# Patient Record
Sex: MALE | Race: WHITE | Employment: OTHER | URBAN - METROPOLITAN AREA
[De-identification: names, ages, dates, MRNs, and addresses within clinical notes are randomized per-mention and may not be internally consistent; named-entity substitution may affect disease eponyms.]

---

## 2017-02-12 RX ORDER — SIMVASTATIN 20 MG/1
TABLET, FILM COATED ORAL
Qty: 90 TAB | Refills: 1 | Status: SHIPPED | OUTPATIENT
Start: 2017-02-12 | End: 2021-01-01

## 2017-08-21 RX ORDER — ALLOPURINOL 100 MG/1
TABLET ORAL
Qty: 180 TAB | Refills: 2 | Status: SHIPPED | OUTPATIENT
Start: 2017-08-21

## 2021-01-01 ENCOUNTER — HOSPITAL ENCOUNTER (INPATIENT)
Age: 86
LOS: 8 days | Discharge: NURSING, REHAB OR LONG-TERM CARE | DRG: 871 | End: 2021-02-26
Attending: EMERGENCY MEDICINE | Admitting: HOSPITALIST
Payer: MEDICARE

## 2021-01-01 ENCOUNTER — HOSPICE ADMISSION (OUTPATIENT)
Dept: HOSPICE | Facility: HOSPICE | Age: 86
End: 2021-01-01
Payer: MEDICARE

## 2021-01-01 ENCOUNTER — TELEPHONE (OUTPATIENT)
Dept: INTERNAL MEDICINE CLINIC | Age: 86
End: 2021-01-01

## 2021-01-01 ENCOUNTER — APPOINTMENT (OUTPATIENT)
Dept: INTERVENTIONAL RADIOLOGY/VASCULAR | Age: 86
DRG: 871 | End: 2021-01-01
Attending: SURGERY
Payer: MEDICARE

## 2021-01-01 ENCOUNTER — APPOINTMENT (OUTPATIENT)
Dept: GENERAL RADIOLOGY | Age: 86
DRG: 871 | End: 2021-01-01
Attending: EMERGENCY MEDICINE
Payer: MEDICARE

## 2021-01-01 ENCOUNTER — APPOINTMENT (OUTPATIENT)
Dept: CT IMAGING | Age: 86
DRG: 951 | End: 2021-01-01
Attending: EMERGENCY MEDICINE
Payer: OTHER MISCELLANEOUS

## 2021-01-01 ENCOUNTER — HOSPITAL ENCOUNTER (INPATIENT)
Age: 86
LOS: 3 days | DRG: 951 | End: 2021-03-14
Attending: EMERGENCY MEDICINE | Admitting: INTERNAL MEDICINE
Payer: OTHER MISCELLANEOUS

## 2021-01-01 ENCOUNTER — APPOINTMENT (OUTPATIENT)
Dept: ULTRASOUND IMAGING | Age: 86
DRG: 871 | End: 2021-01-01
Attending: EMERGENCY MEDICINE
Payer: MEDICARE

## 2021-01-01 ENCOUNTER — APPOINTMENT (OUTPATIENT)
Dept: NUCLEAR MEDICINE | Age: 86
DRG: 871 | End: 2021-01-01
Attending: SURGERY
Payer: MEDICARE

## 2021-01-01 ENCOUNTER — APPOINTMENT (OUTPATIENT)
Dept: CT IMAGING | Age: 86
DRG: 871 | End: 2021-01-01
Attending: EMERGENCY MEDICINE
Payer: MEDICARE

## 2021-01-01 ENCOUNTER — HOME CARE VISIT (OUTPATIENT)
Dept: HOSPICE | Facility: HOSPICE | Age: 86
End: 2021-01-01
Payer: MEDICARE

## 2021-01-01 VITALS
RESPIRATION RATE: 16 BRPM | TEMPERATURE: 97.4 F | WEIGHT: 181.3 LBS | OXYGEN SATURATION: 92 % | HEART RATE: 101 BPM | BODY MASS INDEX: 31.12 KG/M2 | DIASTOLIC BLOOD PRESSURE: 49 MMHG | SYSTOLIC BLOOD PRESSURE: 56 MMHG

## 2021-01-01 VITALS
HEIGHT: 64 IN | OXYGEN SATURATION: 95 % | DIASTOLIC BLOOD PRESSURE: 78 MMHG | HEART RATE: 76 BPM | WEIGHT: 184 LBS | SYSTOLIC BLOOD PRESSURE: 119 MMHG | RESPIRATION RATE: 17 BRPM | BODY MASS INDEX: 31.41 KG/M2 | TEMPERATURE: 98 F

## 2021-01-01 DIAGNOSIS — G93.41 ACUTE METABOLIC ENCEPHALOPATHY: ICD-10-CM

## 2021-01-01 DIAGNOSIS — R53.81 DEBILITY: ICD-10-CM

## 2021-01-01 DIAGNOSIS — F05 DELIRIUM DUE TO ANOTHER MEDICAL CONDITION, ACUTE, HYPOACTIVE: ICD-10-CM

## 2021-01-01 DIAGNOSIS — K81.9 GALL BLADDER INFLAMMATION: ICD-10-CM

## 2021-01-01 DIAGNOSIS — Z51.5 END OF LIFE CARE: ICD-10-CM

## 2021-01-01 DIAGNOSIS — R17 ELEVATED BILIRUBIN: ICD-10-CM

## 2021-01-01 DIAGNOSIS — K81.9 CHOLECYSTITIS: ICD-10-CM

## 2021-01-01 DIAGNOSIS — E87.6 HYPOKALEMIA: Primary | ICD-10-CM

## 2021-01-01 DIAGNOSIS — R11.2 NON-INTRACTABLE VOMITING WITH NAUSEA, UNSPECIFIED VOMITING TYPE: ICD-10-CM

## 2021-01-01 DIAGNOSIS — Z51.5 HOSPICE CARE PATIENT: ICD-10-CM

## 2021-01-01 DIAGNOSIS — R10.13 ABDOMINAL PAIN, EPIGASTRIC: ICD-10-CM

## 2021-01-01 DIAGNOSIS — J96.01 ACUTE RESPIRATORY FAILURE WITH HYPOXIA (HCC): ICD-10-CM

## 2021-01-01 DIAGNOSIS — K81.0 ACUTE CHOLECYSTITIS: ICD-10-CM

## 2021-01-01 DIAGNOSIS — R41.89 UNRESPONSIVENESS: ICD-10-CM

## 2021-01-01 LAB
ABO + RH BLD: NORMAL
ALBUMIN SERPL-MCNC: 1.7 G/DL (ref 3.5–5)
ALBUMIN SERPL-MCNC: 1.8 G/DL (ref 3.5–5)
ALBUMIN SERPL-MCNC: 1.8 G/DL (ref 3.5–5)
ALBUMIN SERPL-MCNC: 1.9 G/DL (ref 3.5–5)
ALBUMIN SERPL-MCNC: 1.9 G/DL (ref 3.5–5)
ALBUMIN SERPL-MCNC: 2 G/DL (ref 3.5–5)
ALBUMIN SERPL-MCNC: 2.1 G/DL (ref 3.5–5)
ALBUMIN SERPL-MCNC: 2.6 G/DL (ref 3.5–5)
ALBUMIN/GLOB SERPL: 0.5 {RATIO} (ref 1.1–2.2)
ALBUMIN/GLOB SERPL: 0.5 {RATIO} (ref 1.1–2.2)
ALBUMIN/GLOB SERPL: 0.6 {RATIO} (ref 1.1–2.2)
ALBUMIN/GLOB SERPL: 0.8 {RATIO} (ref 1.1–2.2)
ALP SERPL-CCNC: 114 U/L (ref 45–117)
ALP SERPL-CCNC: 63 U/L (ref 45–117)
ALP SERPL-CCNC: 66 U/L (ref 45–117)
ALP SERPL-CCNC: 66 U/L (ref 45–117)
ALP SERPL-CCNC: 73 U/L (ref 45–117)
ALP SERPL-CCNC: 84 U/L (ref 45–117)
ALP SERPL-CCNC: 91 U/L (ref 45–117)
ALP SERPL-CCNC: 98 U/L (ref 45–117)
ALT SERPL-CCNC: 16 U/L (ref 12–78)
ALT SERPL-CCNC: 17 U/L (ref 12–78)
ALT SERPL-CCNC: 19 U/L (ref 12–78)
ALT SERPL-CCNC: 23 U/L (ref 12–78)
ALT SERPL-CCNC: 24 U/L (ref 12–78)
ALT SERPL-CCNC: 29 U/L (ref 12–78)
AMMONIA PLAS-SCNC: 24 UMOL/L
AMPHET UR QL SCN: NEGATIVE
ANION GAP SERPL CALC-SCNC: 11 MMOL/L (ref 5–15)
ANION GAP SERPL CALC-SCNC: 6 MMOL/L (ref 5–15)
ANION GAP SERPL CALC-SCNC: 8 MMOL/L (ref 5–15)
ANION GAP SERPL CALC-SCNC: 9 MMOL/L (ref 5–15)
APPEARANCE UR: ABNORMAL
AST SERPL-CCNC: 17 U/L (ref 15–37)
AST SERPL-CCNC: 21 U/L (ref 15–37)
AST SERPL-CCNC: 22 U/L (ref 15–37)
AST SERPL-CCNC: 26 U/L (ref 15–37)
AST SERPL-CCNC: 26 U/L (ref 15–37)
AST SERPL-CCNC: 29 U/L (ref 15–37)
AST SERPL-CCNC: 37 U/L (ref 15–37)
AST SERPL-CCNC: 41 U/L (ref 15–37)
ATRIAL RATE: 105 BPM
ATRIAL RATE: 84 BPM
BACTERIA SPEC CULT: NORMAL
BACTERIA URNS QL MICRO: ABNORMAL /HPF
BARBITURATES UR QL SCN: NEGATIVE
BASOPHILS # BLD: 0 K/UL (ref 0–0.1)
BASOPHILS # BLD: 0.1 K/UL (ref 0–0.1)
BASOPHILS # BLD: 0.2 K/UL (ref 0–0.1)
BASOPHILS NFR BLD: 0 % (ref 0–1)
BASOPHILS NFR BLD: 1 % (ref 0–1)
BENZODIAZ UR QL: NEGATIVE
BILIRUB DIRECT SERPL-MCNC: 1.3 MG/DL (ref 0–0.2)
BILIRUB SERPL-MCNC: 1 MG/DL (ref 0.2–1)
BILIRUB SERPL-MCNC: 1.1 MG/DL (ref 0.2–1)
BILIRUB SERPL-MCNC: 1.1 MG/DL (ref 0.2–1)
BILIRUB SERPL-MCNC: 1.5 MG/DL (ref 0.2–1)
BILIRUB SERPL-MCNC: 2 MG/DL (ref 0.2–1)
BILIRUB SERPL-MCNC: 2.1 MG/DL (ref 0.2–1)
BILIRUB SERPL-MCNC: 2.4 MG/DL (ref 0.2–1)
BILIRUB SERPL-MCNC: 2.6 MG/DL (ref 0.2–1)
BILIRUB UR QL CFM: NEGATIVE
BLOOD GROUP ANTIBODIES SERPL: NORMAL
BUN SERPL-MCNC: 22 MG/DL (ref 6–20)
BUN SERPL-MCNC: 24 MG/DL (ref 6–20)
BUN SERPL-MCNC: 25 MG/DL (ref 6–20)
BUN SERPL-MCNC: 25 MG/DL (ref 6–20)
BUN SERPL-MCNC: 27 MG/DL (ref 6–20)
BUN SERPL-MCNC: 29 MG/DL (ref 6–20)
BUN SERPL-MCNC: 30 MG/DL (ref 6–20)
BUN SERPL-MCNC: 32 MG/DL (ref 6–20)
BUN/CREAT SERPL: 17 (ref 12–20)
BUN/CREAT SERPL: 19 (ref 12–20)
BUN/CREAT SERPL: 20 (ref 12–20)
BUN/CREAT SERPL: 20 (ref 12–20)
BUN/CREAT SERPL: 21 (ref 12–20)
BUN/CREAT SERPL: 21 (ref 12–20)
BUN/CREAT SERPL: 22 (ref 12–20)
BUN/CREAT SERPL: 23 (ref 12–20)
CALCIUM SERPL-MCNC: 7.8 MG/DL (ref 8.5–10.1)
CALCIUM SERPL-MCNC: 7.8 MG/DL (ref 8.5–10.1)
CALCIUM SERPL-MCNC: 8 MG/DL (ref 8.5–10.1)
CALCIUM SERPL-MCNC: 8 MG/DL (ref 8.5–10.1)
CALCIUM SERPL-MCNC: 8.1 MG/DL (ref 8.5–10.1)
CALCIUM SERPL-MCNC: 8.1 MG/DL (ref 8.5–10.1)
CALCIUM SERPL-MCNC: 8.6 MG/DL (ref 8.5–10.1)
CALCIUM SERPL-MCNC: 8.6 MG/DL (ref 8.5–10.1)
CALCULATED R AXIS, ECG10: -19 DEGREES
CALCULATED R AXIS, ECG10: -23 DEGREES
CALCULATED T AXIS, ECG11: -37 DEGREES
CALCULATED T AXIS, ECG11: -43 DEGREES
CANNABINOIDS UR QL SCN: NEGATIVE
CHLORIDE SERPL-SCNC: 105 MMOL/L (ref 97–108)
CHLORIDE SERPL-SCNC: 111 MMOL/L (ref 97–108)
CHLORIDE SERPL-SCNC: 112 MMOL/L (ref 97–108)
CHLORIDE SERPL-SCNC: 113 MMOL/L (ref 97–108)
CHLORIDE SERPL-SCNC: 114 MMOL/L (ref 97–108)
CHLORIDE SERPL-SCNC: 115 MMOL/L (ref 97–108)
CO2 SERPL-SCNC: 18 MMOL/L (ref 21–32)
CO2 SERPL-SCNC: 22 MMOL/L (ref 21–32)
CO2 SERPL-SCNC: 22 MMOL/L (ref 21–32)
CO2 SERPL-SCNC: 23 MMOL/L (ref 21–32)
CO2 SERPL-SCNC: 23 MMOL/L (ref 21–32)
CO2 SERPL-SCNC: 24 MMOL/L (ref 21–32)
CO2 SERPL-SCNC: 24 MMOL/L (ref 21–32)
CO2 SERPL-SCNC: 30 MMOL/L (ref 21–32)
COCAINE UR QL SCN: NEGATIVE
COLOR UR: ABNORMAL
COMMENT, HOLDF: NORMAL
COVID-19 RAPID TEST, COVR: NOT DETECTED
CREAT SERPL-MCNC: 1.05 MG/DL (ref 0.7–1.3)
CREAT SERPL-MCNC: 1.15 MG/DL (ref 0.7–1.3)
CREAT SERPL-MCNC: 1.21 MG/DL (ref 0.7–1.3)
CREAT SERPL-MCNC: 1.28 MG/DL (ref 0.7–1.3)
CREAT SERPL-MCNC: 1.36 MG/DL (ref 0.7–1.3)
CREAT SERPL-MCNC: 1.42 MG/DL (ref 0.7–1.3)
CREAT SERPL-MCNC: 1.51 MG/DL (ref 0.7–1.3)
CREAT SERPL-MCNC: 1.57 MG/DL (ref 0.7–1.3)
CREAT UR-MCNC: 96 MG/DL
DIAGNOSIS, 93000: NORMAL
DIAGNOSIS, 93000: NORMAL
DIFFERENTIAL METHOD BLD: ABNORMAL
DRUG SCRN COMMENT,DRGCM: NORMAL
EOSINOPHIL # BLD: 0 K/UL (ref 0–0.4)
EOSINOPHIL # BLD: 0.1 K/UL (ref 0–0.4)
EOSINOPHIL # BLD: 0.2 K/UL (ref 0–0.4)
EOSINOPHIL # BLD: 0.7 K/UL (ref 0–0.4)
EOSINOPHIL NFR BLD: 0 % (ref 0–7)
EOSINOPHIL NFR BLD: 1 % (ref 0–7)
EOSINOPHIL NFR BLD: 2 % (ref 0–7)
EOSINOPHIL NFR BLD: 4 % (ref 0–7)
EPITH CASTS URNS QL MICRO: ABNORMAL /LPF
ERYTHROCYTE [DISTWIDTH] IN BLOOD BY AUTOMATED COUNT: 17.5 % (ref 11.5–14.5)
ERYTHROCYTE [DISTWIDTH] IN BLOOD BY AUTOMATED COUNT: 18.2 % (ref 11.5–14.5)
ERYTHROCYTE [DISTWIDTH] IN BLOOD BY AUTOMATED COUNT: 18.3 % (ref 11.5–14.5)
ERYTHROCYTE [DISTWIDTH] IN BLOOD BY AUTOMATED COUNT: 18.5 % (ref 11.5–14.5)
ERYTHROCYTE [DISTWIDTH] IN BLOOD BY AUTOMATED COUNT: 19.2 % (ref 11.5–14.5)
ERYTHROCYTE [DISTWIDTH] IN BLOOD BY AUTOMATED COUNT: 19.3 % (ref 11.5–14.5)
ERYTHROCYTE [DISTWIDTH] IN BLOOD BY AUTOMATED COUNT: 19.4 % (ref 11.5–14.5)
ERYTHROCYTE [DISTWIDTH] IN BLOOD BY AUTOMATED COUNT: 19.5 % (ref 11.5–14.5)
GLOBULIN SER CALC-MCNC: 3 G/DL (ref 2–4)
GLOBULIN SER CALC-MCNC: 3.1 G/DL (ref 2–4)
GLOBULIN SER CALC-MCNC: 3.1 G/DL (ref 2–4)
GLOBULIN SER CALC-MCNC: 3.4 G/DL (ref 2–4)
GLOBULIN SER CALC-MCNC: 3.4 G/DL (ref 2–4)
GLOBULIN SER CALC-MCNC: 3.6 G/DL (ref 2–4)
GLOBULIN SER CALC-MCNC: 3.7 G/DL (ref 2–4)
GLOBULIN SER CALC-MCNC: 3.8 G/DL (ref 2–4)
GLUCOSE BLD STRIP.AUTO-MCNC: 145 MG/DL (ref 65–100)
GLUCOSE BLD STRIP.AUTO-MCNC: 203 MG/DL (ref 65–100)
GLUCOSE BLD STRIP.AUTO-MCNC: 92 MG/DL (ref 65–100)
GLUCOSE SERPL-MCNC: 111 MG/DL (ref 65–100)
GLUCOSE SERPL-MCNC: 131 MG/DL (ref 65–100)
GLUCOSE SERPL-MCNC: 154 MG/DL (ref 65–100)
GLUCOSE SERPL-MCNC: 171 MG/DL (ref 65–100)
GLUCOSE SERPL-MCNC: 181 MG/DL (ref 65–100)
GLUCOSE SERPL-MCNC: 70 MG/DL (ref 65–100)
GLUCOSE SERPL-MCNC: 86 MG/DL (ref 65–100)
GLUCOSE SERPL-MCNC: 98 MG/DL (ref 65–100)
GLUCOSE UR STRIP.AUTO-MCNC: NEGATIVE MG/DL
GRAM STN SPEC: NORMAL
GRAM STN SPEC: NORMAL
HCT VFR BLD AUTO: 31.8 % (ref 36.6–50.3)
HCT VFR BLD AUTO: 34.1 % (ref 36.6–50.3)
HCT VFR BLD AUTO: 34.4 % (ref 36.6–50.3)
HCT VFR BLD AUTO: 34.9 % (ref 36.6–50.3)
HCT VFR BLD AUTO: 37.1 % (ref 36.6–50.3)
HCT VFR BLD AUTO: 39.1 % (ref 36.6–50.3)
HCT VFR BLD AUTO: 43 % (ref 36.6–50.3)
HCT VFR BLD AUTO: 43 % (ref 36.6–50.3)
HGB BLD-MCNC: 11.1 G/DL (ref 12.1–17)
HGB BLD-MCNC: 11.9 G/DL (ref 12.1–17)
HGB BLD-MCNC: 12.1 G/DL (ref 12.1–17)
HGB BLD-MCNC: 12.3 G/DL (ref 12.1–17)
HGB BLD-MCNC: 12.6 G/DL (ref 12.1–17)
HGB BLD-MCNC: 13.3 G/DL (ref 12.1–17)
HGB BLD-MCNC: 14.5 G/DL (ref 12.1–17)
HGB BLD-MCNC: 14.7 G/DL (ref 12.1–17)
HGB UR QL STRIP: ABNORMAL
HYALINE CASTS URNS QL MICRO: ABNORMAL /LPF (ref 0–5)
IMM GRANULOCYTES # BLD AUTO: 0 K/UL
IMM GRANULOCYTES # BLD AUTO: 0 K/UL
IMM GRANULOCYTES # BLD AUTO: 0.1 K/UL (ref 0–0.04)
IMM GRANULOCYTES # BLD AUTO: 0.2 K/UL (ref 0–0.04)
IMM GRANULOCYTES NFR BLD AUTO: 0 %
IMM GRANULOCYTES NFR BLD AUTO: 0 %
IMM GRANULOCYTES NFR BLD AUTO: 1 % (ref 0–0.5)
IMM GRANULOCYTES NFR BLD AUTO: 2 % (ref 0–0.5)
INR PPP: 1.3 (ref 0.9–1.1)
INR PPP: 1.4 (ref 0.9–1.1)
INR PPP: 1.9 (ref 0.9–1.1)
KETONES UR QL STRIP.AUTO: ABNORMAL MG/DL
LACTATE SERPL-SCNC: 1.8 MMOL/L (ref 0.4–2)
LACTATE SERPL-SCNC: 2.2 MMOL/L (ref 0.4–2)
LEUKOCYTE ESTERASE UR QL STRIP.AUTO: ABNORMAL
LIPASE SERPL-CCNC: 127 U/L (ref 73–393)
LYMPHOCYTES # BLD: 0.2 K/UL (ref 0.8–3.5)
LYMPHOCYTES # BLD: 0.2 K/UL (ref 0.8–3.5)
LYMPHOCYTES # BLD: 0.5 K/UL (ref 0.8–3.5)
LYMPHOCYTES # BLD: 0.5 K/UL (ref 0.8–3.5)
LYMPHOCYTES # BLD: 0.6 K/UL (ref 0.8–3.5)
LYMPHOCYTES # BLD: 0.6 K/UL (ref 0.8–3.5)
LYMPHOCYTES # BLD: 0.7 K/UL (ref 0.8–3.5)
LYMPHOCYTES # BLD: 0.7 K/UL (ref 0.8–3.5)
LYMPHOCYTES NFR BLD: 1 % (ref 12–49)
LYMPHOCYTES NFR BLD: 1 % (ref 12–49)
LYMPHOCYTES NFR BLD: 3 % (ref 12–49)
LYMPHOCYTES NFR BLD: 3 % (ref 12–49)
LYMPHOCYTES NFR BLD: 5 % (ref 12–49)
LYMPHOCYTES NFR BLD: 6 % (ref 12–49)
LYMPHOCYTES NFR BLD: 6 % (ref 12–49)
LYMPHOCYTES NFR BLD: 7 % (ref 12–49)
MAGNESIUM SERPL-MCNC: 1.5 MG/DL (ref 1.6–2.4)
MCH RBC QN AUTO: 26.1 PG (ref 26–34)
MCH RBC QN AUTO: 26.1 PG (ref 26–34)
MCH RBC QN AUTO: 26.4 PG (ref 26–34)
MCH RBC QN AUTO: 26.5 PG (ref 26–34)
MCH RBC QN AUTO: 26.6 PG (ref 26–34)
MCH RBC QN AUTO: 26.6 PG (ref 26–34)
MCHC RBC AUTO-ENTMCNC: 33.7 G/DL (ref 30–36.5)
MCHC RBC AUTO-ENTMCNC: 34 G/DL (ref 30–36.5)
MCHC RBC AUTO-ENTMCNC: 34 G/DL (ref 30–36.5)
MCHC RBC AUTO-ENTMCNC: 34.2 G/DL (ref 30–36.5)
MCHC RBC AUTO-ENTMCNC: 34.6 G/DL (ref 30–36.5)
MCHC RBC AUTO-ENTMCNC: 34.9 G/DL (ref 30–36.5)
MCHC RBC AUTO-ENTMCNC: 35.2 G/DL (ref 30–36.5)
MCHC RBC AUTO-ENTMCNC: 35.5 G/DL (ref 30–36.5)
MCV RBC AUTO: 74.9 FL (ref 80–99)
MCV RBC AUTO: 75.2 FL (ref 80–99)
MCV RBC AUTO: 75.4 FL (ref 80–99)
MCV RBC AUTO: 75.9 FL (ref 80–99)
MCV RBC AUTO: 77 FL (ref 80–99)
MCV RBC AUTO: 77.7 FL (ref 80–99)
MCV RBC AUTO: 77.9 FL (ref 80–99)
MCV RBC AUTO: 78.5 FL (ref 80–99)
METHADONE UR QL: NEGATIVE
MONOCYTES # BLD: 0.2 K/UL (ref 0–1)
MONOCYTES # BLD: 0.4 K/UL (ref 0–1)
MONOCYTES # BLD: 0.5 K/UL (ref 0–1)
MONOCYTES # BLD: 0.6 K/UL (ref 0–1)
MONOCYTES # BLD: 0.6 K/UL (ref 0–1)
MONOCYTES # BLD: 0.8 K/UL (ref 0–1)
MONOCYTES NFR BLD: 1 % (ref 5–13)
MONOCYTES NFR BLD: 3 % (ref 5–13)
MONOCYTES NFR BLD: 3 % (ref 5–13)
MONOCYTES NFR BLD: 4 % (ref 5–13)
MONOCYTES NFR BLD: 5 % (ref 5–13)
MONOCYTES NFR BLD: 6 % (ref 5–13)
NEUTS BAND NFR BLD MANUAL: 15 % (ref 0–6)
NEUTS SEG # BLD: 10.1 K/UL (ref 1.8–8)
NEUTS SEG # BLD: 12 K/UL (ref 1.8–8)
NEUTS SEG # BLD: 14.5 K/UL (ref 1.8–8)
NEUTS SEG # BLD: 14.8 K/UL (ref 1.8–8)
NEUTS SEG # BLD: 15.5 K/UL (ref 1.8–8)
NEUTS SEG # BLD: 16.7 K/UL (ref 1.8–8)
NEUTS SEG # BLD: 7.7 K/UL (ref 1.8–8)
NEUTS SEG # BLD: 8.4 K/UL (ref 1.8–8)
NEUTS SEG NFR BLD: 76 % (ref 32–75)
NEUTS SEG NFR BLD: 86 % (ref 32–75)
NEUTS SEG NFR BLD: 87 % (ref 32–75)
NEUTS SEG NFR BLD: 87 % (ref 32–75)
NEUTS SEG NFR BLD: 88 % (ref 32–75)
NEUTS SEG NFR BLD: 91 % (ref 32–75)
NEUTS SEG NFR BLD: 93 % (ref 32–75)
NEUTS SEG NFR BLD: 98 % (ref 32–75)
NITRITE UR QL STRIP.AUTO: NEGATIVE
NRBC # BLD: 0 K/UL (ref 0–0.01)
NRBC BLD-RTO: 0 PER 100 WBC
OPIATES UR QL: NEGATIVE
PCP UR QL: NEGATIVE
PH UR STRIP: 6 [PH] (ref 5–8)
PLATELET # BLD AUTO: 233 K/UL (ref 150–400)
PLATELET # BLD AUTO: 234 K/UL (ref 150–400)
PLATELET # BLD AUTO: 235 K/UL (ref 150–400)
PLATELET # BLD AUTO: 242 K/UL (ref 150–400)
PLATELET # BLD AUTO: 243 K/UL (ref 150–400)
PLATELET # BLD AUTO: 245 K/UL (ref 150–400)
PLATELET # BLD AUTO: 247 K/UL (ref 150–400)
PLATELET # BLD AUTO: 255 K/UL (ref 150–400)
PMV BLD AUTO: 10.6 FL (ref 8.9–12.9)
PMV BLD AUTO: 10.6 FL (ref 8.9–12.9)
PMV BLD AUTO: 11 FL (ref 8.9–12.9)
PMV BLD AUTO: 11.4 FL (ref 8.9–12.9)
PMV BLD AUTO: 11.7 FL (ref 8.9–12.9)
PMV BLD AUTO: 12 FL (ref 8.9–12.9)
PMV BLD AUTO: 12 FL (ref 8.9–12.9)
POTASSIUM SERPL-SCNC: 2.7 MMOL/L (ref 3.5–5.1)
POTASSIUM SERPL-SCNC: 3 MMOL/L (ref 3.5–5.1)
POTASSIUM SERPL-SCNC: 3.2 MMOL/L (ref 3.5–5.1)
POTASSIUM SERPL-SCNC: 3.2 MMOL/L (ref 3.5–5.1)
POTASSIUM SERPL-SCNC: 3.3 MMOL/L (ref 3.5–5.1)
POTASSIUM SERPL-SCNC: 3.4 MMOL/L (ref 3.5–5.1)
POTASSIUM SERPL-SCNC: 3.5 MMOL/L (ref 3.5–5.1)
POTASSIUM SERPL-SCNC: 3.6 MMOL/L (ref 3.5–5.1)
PROT SERPL-MCNC: 4.7 G/DL (ref 6.4–8.2)
PROT SERPL-MCNC: 5 G/DL (ref 6.4–8.2)
PROT SERPL-MCNC: 5 G/DL (ref 6.4–8.2)
PROT SERPL-MCNC: 5.2 G/DL (ref 6.4–8.2)
PROT SERPL-MCNC: 5.5 G/DL (ref 6.4–8.2)
PROT SERPL-MCNC: 5.6 G/DL (ref 6.4–8.2)
PROT SERPL-MCNC: 5.9 G/DL (ref 6.4–8.2)
PROT SERPL-MCNC: 6 G/DL (ref 6.4–8.2)
PROT UR STRIP-MCNC: 30 MG/DL
PROTHROMBIN TIME: 13.6 SEC (ref 9–11.1)
PROTHROMBIN TIME: 14.5 SEC (ref 9–11.1)
PROTHROMBIN TIME: 19.2 SEC (ref 9–11.1)
Q-T INTERVAL, ECG07: 300 MS
Q-T INTERVAL, ECG07: 436 MS
QRS DURATION, ECG06: 78 MS
QRS DURATION, ECG06: 84 MS
QTC CALCULATION (BEZET), ECG08: 391 MS
QTC CALCULATION (BEZET), ECG08: 530 MS
RBC # BLD AUTO: 4.19 M/UL (ref 4.1–5.7)
RBC # BLD AUTO: 4.55 M/UL (ref 4.1–5.7)
RBC # BLD AUTO: 4.56 M/UL (ref 4.1–5.7)
RBC # BLD AUTO: 4.64 M/UL (ref 4.1–5.7)
RBC # BLD AUTO: 4.82 M/UL (ref 4.1–5.7)
RBC # BLD AUTO: 5.03 M/UL (ref 4.1–5.7)
RBC # BLD AUTO: 5.48 M/UL (ref 4.1–5.7)
RBC # BLD AUTO: 5.52 M/UL (ref 4.1–5.7)
RBC #/AREA URNS HPF: >100 /HPF (ref 0–5)
RBC MORPH BLD: ABNORMAL
SAMPLES BEING HELD,HOLD: NORMAL
SARS-COV-2, COV2: DETECTED
SARS-COV-2, COV2: NORMAL
SARS-COV-2, COV2: NORMAL
SERVICE CMNT-IMP: ABNORMAL
SERVICE CMNT-IMP: ABNORMAL
SERVICE CMNT-IMP: NORMAL
SODIUM SERPL-SCNC: 141 MMOL/L (ref 136–145)
SODIUM SERPL-SCNC: 142 MMOL/L (ref 136–145)
SODIUM SERPL-SCNC: 143 MMOL/L (ref 136–145)
SODIUM SERPL-SCNC: 143 MMOL/L (ref 136–145)
SODIUM SERPL-SCNC: 144 MMOL/L (ref 136–145)
SODIUM SERPL-SCNC: 144 MMOL/L (ref 136–145)
SODIUM SERPL-SCNC: 145 MMOL/L (ref 136–145)
SODIUM SERPL-SCNC: 146 MMOL/L (ref 136–145)
SODIUM UR-SCNC: 15 MMOL/L
SOURCE, COVRS: NORMAL
SP GR UR REFRACTOMETRY: 1.02 (ref 1–1.03)
SPECIMEN EXP DATE BLD: NORMAL
SPECIMEN SOURCE, FCOV2M: ABNORMAL
TROPONIN I SERPL-MCNC: <0.05 NG/ML
TSH SERPL DL<=0.05 MIU/L-ACNC: 2.25 UIU/ML (ref 0.36–3.74)
UA: UC IF INDICATED,UAUC: ABNORMAL
UROBILINOGEN UR QL STRIP.AUTO: 1 EU/DL (ref 0.2–1)
VENTRICULAR RATE, ECG03: 102 BPM
VENTRICULAR RATE, ECG03: 89 BPM
WBC # BLD AUTO: 11.6 K/UL (ref 4.1–11.1)
WBC # BLD AUTO: 13.8 K/UL (ref 4.1–11.1)
WBC # BLD AUTO: 15.2 K/UL (ref 4.1–11.1)
WBC # BLD AUTO: 16 K/UL (ref 4.1–11.1)
WBC # BLD AUTO: 16.7 K/UL (ref 4.1–11.1)
WBC # BLD AUTO: 18.3 K/UL (ref 4.1–11.1)
WBC # BLD AUTO: 9 K/UL (ref 4.1–11.1)
WBC # BLD AUTO: 9.6 K/UL (ref 4.1–11.1)
WBC URNS QL MICRO: ABNORMAL /HPF (ref 0–4)

## 2021-01-01 PROCEDURE — 74011250637 HC RX REV CODE- 250/637: Performed by: FAMILY MEDICINE

## 2021-01-01 PROCEDURE — 74011250636 HC RX REV CODE- 250/636: Performed by: FAMILY MEDICINE

## 2021-01-01 PROCEDURE — 74011000258 HC RX REV CODE- 258: Performed by: INTERNAL MEDICINE

## 2021-01-01 PROCEDURE — 76705 ECHO EXAM OF ABDOMEN: CPT

## 2021-01-01 PROCEDURE — 74011000250 HC RX REV CODE- 250: Performed by: INTERNAL MEDICINE

## 2021-01-01 PROCEDURE — 77030019905 HC CATH URETH INTMIT MDII -A

## 2021-01-01 PROCEDURE — 75989 ABSCESS DRAINAGE UNDER X-RAY: CPT

## 2021-01-01 PROCEDURE — 74011250637 HC RX REV CODE- 250/637: Performed by: INTERNAL MEDICINE

## 2021-01-01 PROCEDURE — 36415 COLL VENOUS BLD VENIPUNCTURE: CPT

## 2021-01-01 PROCEDURE — 87040 BLOOD CULTURE FOR BACTERIA: CPT

## 2021-01-01 PROCEDURE — 85610 PROTHROMBIN TIME: CPT

## 2021-01-01 PROCEDURE — 74011000636 HC RX REV CODE- 636: Performed by: RADIOLOGY

## 2021-01-01 PROCEDURE — 74011250636 HC RX REV CODE- 250/636: Performed by: HOSPITALIST

## 2021-01-01 PROCEDURE — 85025 COMPLETE CBC W/AUTO DIFF WBC: CPT

## 2021-01-01 PROCEDURE — 97530 THERAPEUTIC ACTIVITIES: CPT

## 2021-01-01 PROCEDURE — 74011250637 HC RX REV CODE- 250/637: Performed by: HOSPITALIST

## 2021-01-01 PROCEDURE — 77030005513 HC CATH URETH FOL11 MDII -B

## 2021-01-01 PROCEDURE — 65270000029 HC RM PRIVATE

## 2021-01-01 PROCEDURE — 82140 ASSAY OF AMMONIA: CPT

## 2021-01-01 PROCEDURE — 74011000258 HC RX REV CODE- 258: Performed by: HOSPITALIST

## 2021-01-01 PROCEDURE — 81001 URINALYSIS AUTO W/SCOPE: CPT

## 2021-01-01 PROCEDURE — 83735 ASSAY OF MAGNESIUM: CPT

## 2021-01-01 PROCEDURE — 80053 COMPREHEN METABOLIC PANEL: CPT

## 2021-01-01 PROCEDURE — 71045 X-RAY EXAM CHEST 1 VIEW: CPT

## 2021-01-01 PROCEDURE — 94760 N-INVAS EAR/PLS OXIMETRY 1: CPT

## 2021-01-01 PROCEDURE — 74011000250 HC RX REV CODE- 250: Performed by: RADIOLOGY

## 2021-01-01 PROCEDURE — 65660000000 HC RM CCU STEPDOWN

## 2021-01-01 PROCEDURE — 0656 HSPC GENERAL INPATIENT

## 2021-01-01 PROCEDURE — C9113 INJ PANTOPRAZOLE SODIUM, VIA: HCPCS | Performed by: HOSPITALIST

## 2021-01-01 PROCEDURE — 99285 EMERGENCY DEPT VISIT HI MDM: CPT

## 2021-01-01 PROCEDURE — 74011250636 HC RX REV CODE- 250/636: Performed by: RADIOLOGY

## 2021-01-01 PROCEDURE — 93005 ELECTROCARDIOGRAM TRACING: CPT

## 2021-01-01 PROCEDURE — C1729 CATH, DRAINAGE: HCPCS

## 2021-01-01 PROCEDURE — 94762 N-INVAS EAR/PLS OXIMTRY CONT: CPT

## 2021-01-01 PROCEDURE — 99223 1ST HOSP IP/OBS HIGH 75: CPT | Performed by: INTERNAL MEDICINE

## 2021-01-01 PROCEDURE — 96368 THER/DIAG CONCURRENT INF: CPT

## 2021-01-01 PROCEDURE — 51798 US URINE CAPACITY MEASURE: CPT

## 2021-01-01 PROCEDURE — A9537 TC99M MEBROFENIN: HCPCS

## 2021-01-01 PROCEDURE — 87205 SMEAR GRAM STAIN: CPT

## 2021-01-01 PROCEDURE — 80076 HEPATIC FUNCTION PANEL: CPT

## 2021-01-01 PROCEDURE — 74011250636 HC RX REV CODE- 250/636: Performed by: INTERNAL MEDICINE

## 2021-01-01 PROCEDURE — 74011000258 HC RX REV CODE- 258: Performed by: EMERGENCY MEDICINE

## 2021-01-01 PROCEDURE — 74178 CT ABD&PLV WO CNTR FLWD CNTR: CPT

## 2021-01-01 PROCEDURE — U0005 INFEC AGEN DETEC AMPLI PROBE: HCPCS

## 2021-01-01 PROCEDURE — 87635 SARS-COV-2 COVID-19 AMP PRB: CPT

## 2021-01-01 PROCEDURE — 84484 ASSAY OF TROPONIN QUANT: CPT

## 2021-01-01 PROCEDURE — 2709999900 HC NON-CHARGEABLE SUPPLY

## 2021-01-01 PROCEDURE — 83690 ASSAY OF LIPASE: CPT

## 2021-01-01 PROCEDURE — 97161 PT EVAL LOW COMPLEX 20 MIN: CPT

## 2021-01-01 PROCEDURE — 92610 EVALUATE SWALLOWING FUNCTION: CPT

## 2021-01-01 PROCEDURE — 96375 TX/PRO/DX INJ NEW DRUG ADDON: CPT

## 2021-01-01 PROCEDURE — 83605 ASSAY OF LACTIC ACID: CPT

## 2021-01-01 PROCEDURE — 86901 BLOOD TYPING SEROLOGIC RH(D): CPT

## 2021-01-01 PROCEDURE — 80048 BASIC METABOLIC PNL TOTAL CA: CPT

## 2021-01-01 PROCEDURE — 96365 THER/PROPH/DIAG IV INF INIT: CPT

## 2021-01-01 PROCEDURE — 82570 ASSAY OF URINE CREATININE: CPT

## 2021-01-01 PROCEDURE — C1894 INTRO/SHEATH, NON-LASER: HCPCS

## 2021-01-01 PROCEDURE — C1769 GUIDE WIRE: HCPCS

## 2021-01-01 PROCEDURE — 3336500001 HSPC ELECTION

## 2021-01-01 PROCEDURE — 87075 CULTR BACTERIA EXCEPT BLOOD: CPT

## 2021-01-01 PROCEDURE — 76942 ECHO GUIDE FOR BIOPSY: CPT

## 2021-01-01 PROCEDURE — 82962 GLUCOSE BLOOD TEST: CPT

## 2021-01-01 PROCEDURE — 77030012865 HC BG URIN LEG MDII -A

## 2021-01-01 PROCEDURE — 70450 CT HEAD/BRAIN W/O DYE: CPT

## 2021-01-01 PROCEDURE — 84300 ASSAY OF URINE SODIUM: CPT

## 2021-01-01 PROCEDURE — 77030011229 HC DIL VESL COON COOK -A

## 2021-01-01 PROCEDURE — 84443 ASSAY THYROID STIM HORMONE: CPT

## 2021-01-01 PROCEDURE — 74011250636 HC RX REV CODE- 250/636: Performed by: EMERGENCY MEDICINE

## 2021-01-01 PROCEDURE — 0F9430Z DRAINAGE OF GALLBLADDER WITH DRAINAGE DEVICE, PERCUTANEOUS APPROACH: ICD-10-PCS | Performed by: RADIOLOGY

## 2021-01-01 PROCEDURE — 80307 DRUG TEST PRSMV CHEM ANLYZR: CPT

## 2021-01-01 RX ORDER — MORPHINE SULFATE 2 MG/ML
2 INJECTION, SOLUTION INTRAMUSCULAR; INTRAVENOUS
Status: DISCONTINUED | OUTPATIENT
Start: 2021-01-01 | End: 2021-01-01 | Stop reason: HOSPADM

## 2021-01-01 RX ORDER — TERAZOSIN 10 MG/1
10 CAPSULE ORAL 2 TIMES DAILY
COMMUNITY

## 2021-01-01 RX ORDER — LEVOTHYROXINE SODIUM 50 UG/1
50 TABLET ORAL
COMMUNITY

## 2021-01-01 RX ORDER — MIDAZOLAM HYDROCHLORIDE 1 MG/ML
1-5 INJECTION, SOLUTION INTRAMUSCULAR; INTRAVENOUS
Status: DISCONTINUED | OUTPATIENT
Start: 2021-01-01 | End: 2021-01-01

## 2021-01-01 RX ORDER — LABETALOL HCL 20 MG/4 ML
20 SYRINGE (ML) INTRAVENOUS
Status: DISCONTINUED | OUTPATIENT
Start: 2021-01-01 | End: 2021-01-01 | Stop reason: HOSPADM

## 2021-01-01 RX ORDER — DIPHENHYDRAMINE HYDROCHLORIDE 50 MG/ML
25 INJECTION, SOLUTION INTRAMUSCULAR; INTRAVENOUS
Status: DISCONTINUED | OUTPATIENT
Start: 2021-01-01 | End: 2021-01-01 | Stop reason: HOSPADM

## 2021-01-01 RX ORDER — FACIAL-BODY WIPES
10 EACH TOPICAL DAILY PRN
Status: DISCONTINUED | OUTPATIENT
Start: 2021-01-01 | End: 2021-01-01 | Stop reason: HOSPADM

## 2021-01-01 RX ORDER — TAMSULOSIN HYDROCHLORIDE 0.4 MG/1
0.4 CAPSULE ORAL DAILY
Qty: 30 CAP | Refills: 0 | Status: SHIPPED | OUTPATIENT
Start: 2021-01-01

## 2021-01-01 RX ORDER — LEVOFLOXACIN 5 MG/ML
750 INJECTION, SOLUTION INTRAVENOUS
Status: DISCONTINUED | OUTPATIENT
Start: 2021-01-01 | End: 2021-01-01

## 2021-01-01 RX ORDER — METOPROLOL TARTRATE 25 MG/1
25 TABLET, FILM COATED ORAL 2 TIMES DAILY
Status: DISCONTINUED | OUTPATIENT
Start: 2021-01-01 | End: 2021-01-01

## 2021-01-01 RX ORDER — POTASSIUM CHLORIDE 7.45 MG/ML
10 INJECTION INTRAVENOUS
Status: COMPLETED | OUTPATIENT
Start: 2021-01-01 | End: 2021-01-01

## 2021-01-01 RX ORDER — POTASSIUM CHLORIDE 750 MG/1
40 TABLET, FILM COATED, EXTENDED RELEASE ORAL
Status: COMPLETED | OUTPATIENT
Start: 2021-01-01 | End: 2021-01-01

## 2021-01-01 RX ORDER — SODIUM CHLORIDE 0.9 % (FLUSH) 0.9 %
5-40 SYRINGE (ML) INJECTION AS NEEDED
Status: DISCONTINUED | OUTPATIENT
Start: 2021-01-01 | End: 2021-01-01 | Stop reason: HOSPADM

## 2021-01-01 RX ORDER — LORAZEPAM 2 MG/ML
0.5 INJECTION INTRAMUSCULAR
Status: DISCONTINUED | OUTPATIENT
Start: 2021-01-01 | End: 2021-01-01 | Stop reason: HOSPADM

## 2021-01-01 RX ORDER — LEVOFLOXACIN 750 MG/1
750 TABLET ORAL
Status: DISCONTINUED | OUTPATIENT
Start: 2021-01-01 | End: 2021-01-01 | Stop reason: HOSPADM

## 2021-01-01 RX ORDER — POLYETHYLENE GLYCOL 3350 17 G/17G
17 POWDER, FOR SOLUTION ORAL DAILY PRN
Status: DISCONTINUED | OUTPATIENT
Start: 2021-01-01 | End: 2021-01-01 | Stop reason: HOSPADM

## 2021-01-01 RX ORDER — SODIUM CHLORIDE 0.9 % (FLUSH) 0.9 %
5-40 SYRINGE (ML) INJECTION EVERY 8 HOURS
Status: DISCONTINUED | OUTPATIENT
Start: 2021-01-01 | End: 2021-01-01 | Stop reason: HOSPADM

## 2021-01-01 RX ORDER — CALCIUM CARB/MAGNESIUM CARB 311-232MG
5 TABLET ORAL
Status: DISCONTINUED | OUTPATIENT
Start: 2021-01-01 | End: 2021-01-01 | Stop reason: HOSPADM

## 2021-01-01 RX ORDER — SIMVASTATIN 20 MG/1
20 TABLET, FILM COATED ORAL
COMMUNITY

## 2021-01-01 RX ORDER — METOPROLOL TARTRATE 25 MG/1
25 TABLET, FILM COATED ORAL 2 TIMES DAILY
COMMUNITY

## 2021-01-01 RX ORDER — PANTOPRAZOLE SODIUM 40 MG/1
40 TABLET, DELAYED RELEASE ORAL
Status: DISCONTINUED | OUTPATIENT
Start: 2021-01-01 | End: 2021-01-01 | Stop reason: HOSPADM

## 2021-01-01 RX ORDER — SODIUM CHLORIDE AND POTASSIUM CHLORIDE .9; .15 G/100ML; G/100ML
SOLUTION INTRAVENOUS CONTINUOUS
Status: DISCONTINUED | OUTPATIENT
Start: 2021-01-01 | End: 2021-01-01

## 2021-01-01 RX ORDER — SODIUM CHLORIDE 450 MG/100ML
75 INJECTION, SOLUTION INTRAVENOUS CONTINUOUS
Status: DISCONTINUED | OUTPATIENT
Start: 2021-01-01 | End: 2021-01-01 | Stop reason: HOSPADM

## 2021-01-01 RX ORDER — OXYCODONE HYDROCHLORIDE 5 MG/1
5 TABLET ORAL
Status: DISCONTINUED | OUTPATIENT
Start: 2021-01-01 | End: 2021-01-01 | Stop reason: HOSPADM

## 2021-01-01 RX ORDER — METOPROLOL TARTRATE 50 MG/1
50 TABLET ORAL 2 TIMES DAILY
Status: DISCONTINUED | OUTPATIENT
Start: 2021-01-01 | End: 2021-01-01 | Stop reason: HOSPADM

## 2021-01-01 RX ORDER — ESCITALOPRAM OXALATE 20 MG/1
20 TABLET ORAL DAILY
COMMUNITY

## 2021-01-01 RX ORDER — ACETAMINOPHEN 325 MG/1
650 TABLET ORAL
Status: DISCONTINUED | OUTPATIENT
Start: 2021-01-01 | End: 2021-01-01 | Stop reason: HOSPADM

## 2021-01-01 RX ORDER — LORAZEPAM 2 MG/ML
1 INJECTION INTRAMUSCULAR
Status: DISCONTINUED | OUTPATIENT
Start: 2021-01-01 | End: 2021-01-01 | Stop reason: HOSPADM

## 2021-01-01 RX ORDER — LEVOFLOXACIN 750 MG/1
750 TABLET ORAL
Qty: 3 TAB | Refills: 0 | Status: SHIPPED | OUTPATIENT
Start: 2021-01-01 | End: 2021-01-01

## 2021-01-01 RX ORDER — ONDANSETRON 2 MG/ML
4 INJECTION INTRAMUSCULAR; INTRAVENOUS
Status: DISCONTINUED | OUTPATIENT
Start: 2021-01-01 | End: 2021-01-01 | Stop reason: HOSPADM

## 2021-01-01 RX ORDER — MORPHINE SULFATE 2 MG/ML
2 INJECTION, SOLUTION INTRAMUSCULAR; INTRAVENOUS EVERY 4 HOURS
Status: DISCONTINUED | OUTPATIENT
Start: 2021-01-01 | End: 2021-01-01 | Stop reason: HOSPADM

## 2021-01-01 RX ORDER — ACETAMINOPHEN 650 MG/1
650 SUPPOSITORY RECTAL
Status: DISCONTINUED | OUTPATIENT
Start: 2021-01-01 | End: 2021-01-01 | Stop reason: HOSPADM

## 2021-01-01 RX ORDER — TAMSULOSIN HYDROCHLORIDE 0.4 MG/1
0.4 CAPSULE ORAL DAILY
Status: DISCONTINUED | OUTPATIENT
Start: 2021-01-01 | End: 2021-01-01 | Stop reason: HOSPADM

## 2021-01-01 RX ORDER — POTASSIUM CHLORIDE 7.45 MG/ML
10 INJECTION INTRAVENOUS
Status: DISPENSED | OUTPATIENT
Start: 2021-01-01 | End: 2021-01-01

## 2021-01-01 RX ORDER — OXYCODONE HYDROCHLORIDE 5 MG/1
10 TABLET ORAL
Status: DISCONTINUED | OUTPATIENT
Start: 2021-01-01 | End: 2021-01-01 | Stop reason: HOSPADM

## 2021-01-01 RX ORDER — ALBUTEROL SULFATE 0.83 MG/ML
2.5 SOLUTION RESPIRATORY (INHALATION)
Status: DISCONTINUED | OUTPATIENT
Start: 2021-01-01 | End: 2021-01-01 | Stop reason: HOSPADM

## 2021-01-01 RX ORDER — LIDOCAINE HYDROCHLORIDE 10 MG/ML
10 INJECTION, SOLUTION EPIDURAL; INFILTRATION; INTRACAUDAL; PERINEURAL
Status: COMPLETED | OUTPATIENT
Start: 2021-01-01 | End: 2021-01-01

## 2021-01-01 RX ORDER — LORAZEPAM 2 MG/ML
1 INJECTION INTRAMUSCULAR EVERY 4 HOURS
Status: DISCONTINUED | OUTPATIENT
Start: 2021-01-01 | End: 2021-01-01 | Stop reason: HOSPADM

## 2021-01-01 RX ORDER — ASPIRIN 81 MG/1
81 TABLET ORAL
COMMUNITY

## 2021-01-01 RX ORDER — SIMETHICONE 80 MG
80 TABLET,CHEWABLE ORAL
Status: DISCONTINUED | OUTPATIENT
Start: 2021-01-01 | End: 2021-01-01 | Stop reason: HOSPADM

## 2021-01-01 RX ORDER — MAGNESIUM SULFATE HEPTAHYDRATE 40 MG/ML
2 INJECTION, SOLUTION INTRAVENOUS ONCE
Status: COMPLETED | OUTPATIENT
Start: 2021-01-01 | End: 2021-01-01

## 2021-01-01 RX ORDER — GLYCOPYRROLATE 0.2 MG/ML
0.2 INJECTION INTRAMUSCULAR; INTRAVENOUS
Status: DISCONTINUED | OUTPATIENT
Start: 2021-01-01 | End: 2021-01-01 | Stop reason: HOSPADM

## 2021-01-01 RX ORDER — FENTANYL CITRATE 50 UG/ML
25-100 INJECTION, SOLUTION INTRAMUSCULAR; INTRAVENOUS
Status: DISCONTINUED | OUTPATIENT
Start: 2021-01-01 | End: 2021-01-01 | Stop reason: HOSPADM

## 2021-01-01 RX ORDER — METOPROLOL TARTRATE 5 MG/5ML
5 INJECTION INTRAVENOUS ONCE
Status: COMPLETED | OUTPATIENT
Start: 2021-01-01 | End: 2021-01-01

## 2021-01-01 RX ORDER — ONDANSETRON 2 MG/ML
4 INJECTION INTRAMUSCULAR; INTRAVENOUS
Status: COMPLETED | OUTPATIENT
Start: 2021-01-01 | End: 2021-01-01

## 2021-01-01 RX ORDER — DIPHENHYDRAMINE HCL 25 MG
25 CAPSULE ORAL
Status: DISCONTINUED | OUTPATIENT
Start: 2021-01-01 | End: 2021-01-01 | Stop reason: HOSPADM

## 2021-01-01 RX ORDER — CIPROFLOXACIN 2 MG/ML
400 INJECTION, SOLUTION INTRAVENOUS EVERY 12 HOURS
Status: DISCONTINUED | OUTPATIENT
Start: 2021-01-01 | End: 2021-01-01 | Stop reason: CLARIF

## 2021-01-01 RX ORDER — METRONIDAZOLE 250 MG/1
500 TABLET ORAL 3 TIMES DAILY
Status: DISCONTINUED | OUTPATIENT
Start: 2021-01-01 | End: 2021-01-01 | Stop reason: HOSPADM

## 2021-01-01 RX ORDER — MAG HYDROX/ALUMINUM HYD/SIMETH 200-200-20
30 SUSPENSION, ORAL (FINAL DOSE FORM) ORAL
Status: DISCONTINUED | OUTPATIENT
Start: 2021-01-01 | End: 2021-01-01 | Stop reason: HOSPADM

## 2021-01-01 RX ORDER — HYDRALAZINE HYDROCHLORIDE 20 MG/ML
20 INJECTION INTRAMUSCULAR; INTRAVENOUS
Status: DISCONTINUED | OUTPATIENT
Start: 2021-01-01 | End: 2021-01-01 | Stop reason: HOSPADM

## 2021-01-01 RX ORDER — LEVOTHYROXINE SODIUM 50 UG/1
50 TABLET ORAL
Status: DISCONTINUED | OUTPATIENT
Start: 2021-01-01 | End: 2021-01-01 | Stop reason: HOSPADM

## 2021-01-01 RX ORDER — ENOXAPARIN SODIUM 100 MG/ML
40 INJECTION SUBCUTANEOUS EVERY 24 HOURS
Status: DISCONTINUED | OUTPATIENT
Start: 2021-01-01 | End: 2021-01-01 | Stop reason: HOSPADM

## 2021-01-01 RX ORDER — LOPERAMIDE HYDROCHLORIDE 2 MG/1
2 CAPSULE ORAL
COMMUNITY

## 2021-01-01 RX ORDER — OXYCODONE HYDROCHLORIDE 5 MG/1
5 TABLET ORAL
Qty: 10 TAB | Refills: 0 | Status: SHIPPED | OUTPATIENT
Start: 2021-01-01 | End: 2021-01-01

## 2021-01-01 RX ORDER — TERAZOSIN 5 MG/1
10 CAPSULE ORAL 2 TIMES DAILY
Status: DISCONTINUED | OUTPATIENT
Start: 2021-01-01 | End: 2021-01-01

## 2021-01-01 RX ORDER — METRONIDAZOLE 500 MG/1
500 TABLET ORAL 3 TIMES DAILY
Qty: 18 TAB | Refills: 0 | Status: SHIPPED | OUTPATIENT
Start: 2021-01-01 | End: 2021-01-01

## 2021-01-01 RX ADMIN — POTASSIUM CHLORIDE 10 MEQ: 10 INJECTION, SOLUTION INTRAVENOUS at 16:30

## 2021-01-01 RX ADMIN — METOPROLOL TARTRATE 50 MG: 50 TABLET, FILM COATED ORAL at 18:07

## 2021-01-01 RX ADMIN — METOPROLOL TARTRATE 50 MG: 50 TABLET, FILM COATED ORAL at 10:05

## 2021-01-01 RX ADMIN — ENOXAPARIN SODIUM 40 MG: 100 INJECTION SUBCUTANEOUS at 21:18

## 2021-01-01 RX ADMIN — ENOXAPARIN SODIUM 40 MG: 100 INJECTION SUBCUTANEOUS at 22:42

## 2021-01-01 RX ADMIN — METRONIDAZOLE 500 MG: 250 TABLET ORAL at 21:36

## 2021-01-01 RX ADMIN — POTASSIUM CHLORIDE 10 MEQ: 10 INJECTION, SOLUTION INTRAVENOUS at 21:55

## 2021-01-01 RX ADMIN — PIPERACILLIN AND TAZOBACTAM 3.38 G: 3; .375 INJECTION, POWDER, LYOPHILIZED, FOR SOLUTION INTRAVENOUS at 06:23

## 2021-01-01 RX ADMIN — MORPHINE SULFATE 2 MG: 2 INJECTION, SOLUTION INTRAMUSCULAR; INTRAVENOUS at 01:07

## 2021-01-01 RX ADMIN — AMIODARONE HYDROCHLORIDE 1 MG/MIN: 50 INJECTION, SOLUTION INTRAVENOUS at 13:30

## 2021-01-01 RX ADMIN — POTASSIUM CHLORIDE AND SODIUM CHLORIDE: 900; 150 INJECTION, SOLUTION INTRAVENOUS at 00:00

## 2021-01-01 RX ADMIN — FENTANYL CITRATE 50 MCG: 50 INJECTION, SOLUTION INTRAMUSCULAR; INTRAVENOUS at 17:02

## 2021-01-01 RX ADMIN — METRONIDAZOLE 500 MG: 250 TABLET ORAL at 17:19

## 2021-01-01 RX ADMIN — MAGNESIUM SULFATE HEPTAHYDRATE 2 G: 40 INJECTION, SOLUTION INTRAVENOUS at 16:30

## 2021-01-01 RX ADMIN — METRONIDAZOLE 500 MG: 250 TABLET ORAL at 08:27

## 2021-01-01 RX ADMIN — MORPHINE SULFATE 2 MG: 2 INJECTION, SOLUTION INTRAMUSCULAR; INTRAVENOUS at 16:33

## 2021-01-01 RX ADMIN — METRONIDAZOLE 500 MG: 250 TABLET ORAL at 17:53

## 2021-01-01 RX ADMIN — LORAZEPAM 1 MG: 2 INJECTION INTRAMUSCULAR; INTRAVENOUS at 01:06

## 2021-01-01 RX ADMIN — ENOXAPARIN SODIUM 40 MG: 100 INJECTION SUBCUTANEOUS at 21:12

## 2021-01-01 RX ADMIN — MORPHINE SULFATE 2 MG: 2 INJECTION, SOLUTION INTRAMUSCULAR; INTRAVENOUS at 12:43

## 2021-01-01 RX ADMIN — POTASSIUM CHLORIDE AND SODIUM CHLORIDE: 900; 150 INJECTION, SOLUTION INTRAVENOUS at 09:52

## 2021-01-01 RX ADMIN — MORPHINE SULFATE 2 MG: 2 INJECTION, SOLUTION INTRAMUSCULAR; INTRAVENOUS at 16:27

## 2021-01-01 RX ADMIN — METOPROLOL TARTRATE 5 MG: 5 INJECTION INTRAVENOUS at 09:24

## 2021-01-01 RX ADMIN — METRONIDAZOLE 500 MG: 250 TABLET ORAL at 16:08

## 2021-01-01 RX ADMIN — Medication 10 ML: at 05:32

## 2021-01-01 RX ADMIN — PANTOPRAZOLE SODIUM 40 MG: 40 INJECTION, POWDER, FOR SOLUTION INTRAVENOUS at 09:51

## 2021-01-01 RX ADMIN — TAMSULOSIN HYDROCHLORIDE 0.4 MG: 0.4 CAPSULE ORAL at 18:29

## 2021-01-01 RX ADMIN — METRONIDAZOLE 500 MG: 250 TABLET ORAL at 21:12

## 2021-01-01 RX ADMIN — METRONIDAZOLE 500 MG: 250 TABLET ORAL at 22:48

## 2021-01-01 RX ADMIN — MORPHINE SULFATE 2 MG: 2 INJECTION, SOLUTION INTRAMUSCULAR; INTRAVENOUS at 12:56

## 2021-01-01 RX ADMIN — LORAZEPAM 1 MG: 2 INJECTION INTRAMUSCULAR; INTRAVENOUS at 19:03

## 2021-01-01 RX ADMIN — METOPROLOL TARTRATE 50 MG: 50 TABLET, FILM COATED ORAL at 17:10

## 2021-01-01 RX ADMIN — SODIUM CHLORIDE 75 ML/HR: 4.5 INJECTION, SOLUTION INTRAVENOUS at 17:19

## 2021-01-01 RX ADMIN — Medication 10 ML: at 21:18

## 2021-01-01 RX ADMIN — SODIUM CHLORIDE 500 ML: 9 INJECTION, SOLUTION INTRAVENOUS at 15:05

## 2021-01-01 RX ADMIN — LEVOTHYROXINE SODIUM 50 MCG: 0.05 TABLET ORAL at 06:13

## 2021-01-01 RX ADMIN — LORAZEPAM 1 MG: 2 INJECTION INTRAMUSCULAR; INTRAVENOUS at 21:04

## 2021-01-01 RX ADMIN — Medication 10 ML: at 22:38

## 2021-01-01 RX ADMIN — PANTOPRAZOLE SODIUM 40 MG: 40 INJECTION, POWDER, FOR SOLUTION INTRAVENOUS at 21:35

## 2021-01-01 RX ADMIN — Medication 10 ML: at 21:16

## 2021-01-01 RX ADMIN — METOPROLOL TARTRATE 50 MG: 50 TABLET, FILM COATED ORAL at 16:09

## 2021-01-01 RX ADMIN — IOPAMIDOL 25 ML: 755 INJECTION, SOLUTION INTRAVENOUS at 17:14

## 2021-01-01 RX ADMIN — SODIUM CHLORIDE, POTASSIUM CHLORIDE, SODIUM LACTATE AND CALCIUM CHLORIDE 1000 ML: 600; 310; 30; 20 INJECTION, SOLUTION INTRAVENOUS at 12:41

## 2021-01-01 RX ADMIN — PANTOPRAZOLE SODIUM 40 MG: 40 INJECTION, POWDER, FOR SOLUTION INTRAVENOUS at 21:15

## 2021-01-01 RX ADMIN — METOPROLOL TARTRATE 50 MG: 50 TABLET, FILM COATED ORAL at 09:27

## 2021-01-01 RX ADMIN — LORAZEPAM 1 MG: 2 INJECTION INTRAMUSCULAR; INTRAVENOUS at 16:27

## 2021-01-01 RX ADMIN — MORPHINE SULFATE 2 MG: 2 INJECTION, SOLUTION INTRAMUSCULAR; INTRAVENOUS at 20:36

## 2021-01-01 RX ADMIN — METOPROLOL TARTRATE 50 MG: 50 TABLET, FILM COATED ORAL at 17:54

## 2021-01-01 RX ADMIN — METOPROLOL TARTRATE 50 MG: 50 TABLET, FILM COATED ORAL at 09:18

## 2021-01-01 RX ADMIN — PIPERACILLIN AND TAZOBACTAM 3.38 G: 3; .375 INJECTION, POWDER, LYOPHILIZED, FOR SOLUTION INTRAVENOUS at 15:45

## 2021-01-01 RX ADMIN — SODIUM CHLORIDE 75 ML/HR: 4.5 INJECTION, SOLUTION INTRAVENOUS at 04:31

## 2021-01-01 RX ADMIN — ENOXAPARIN SODIUM 40 MG: 100 INJECTION SUBCUTANEOUS at 21:35

## 2021-01-01 RX ADMIN — LEVOFLOXACIN 750 MG: 750 INJECTION, SOLUTION INTRAVENOUS at 09:19

## 2021-01-01 RX ADMIN — Medication 10 ML: at 06:23

## 2021-01-01 RX ADMIN — PANTOPRAZOLE SODIUM 40 MG: 40 TABLET, DELAYED RELEASE ORAL at 10:05

## 2021-01-01 RX ADMIN — POTASSIUM CHLORIDE 10 MEQ: 10 INJECTION, SOLUTION INTRAVENOUS at 23:00

## 2021-01-01 RX ADMIN — ONDANSETRON 4 MG: 2 INJECTION INTRAMUSCULAR; INTRAVENOUS at 17:54

## 2021-01-01 RX ADMIN — LORAZEPAM 1 MG: 2 INJECTION INTRAMUSCULAR; INTRAVENOUS at 01:14

## 2021-01-01 RX ADMIN — LORAZEPAM 1 MG: 2 INJECTION INTRAMUSCULAR; INTRAVENOUS at 16:32

## 2021-01-01 RX ADMIN — PIPERACILLIN AND TAZOBACTAM 3.38 G: 3; .375 INJECTION, POWDER, LYOPHILIZED, FOR SOLUTION INTRAVENOUS at 13:14

## 2021-01-01 RX ADMIN — PANTOPRAZOLE SODIUM 40 MG: 40 INJECTION, POWDER, FOR SOLUTION INTRAVENOUS at 22:47

## 2021-01-01 RX ADMIN — POTASSIUM CHLORIDE 10 MEQ: 10 INJECTION, SOLUTION INTRAVENOUS at 01:00

## 2021-01-01 RX ADMIN — Medication 10 ML: at 05:42

## 2021-01-01 RX ADMIN — Medication 10 ML: at 21:36

## 2021-01-01 RX ADMIN — PANTOPRAZOLE SODIUM 40 MG: 40 INJECTION, POWDER, FOR SOLUTION INTRAVENOUS at 21:55

## 2021-01-01 RX ADMIN — MORPHINE SULFATE 2 MG: 2 INJECTION, SOLUTION INTRAMUSCULAR; INTRAVENOUS at 04:56

## 2021-01-01 RX ADMIN — LORAZEPAM 1 MG: 2 INJECTION INTRAMUSCULAR; INTRAVENOUS at 05:03

## 2021-01-01 RX ADMIN — SODIUM CHLORIDE 500 ML: 9 INJECTION, SOLUTION INTRAVENOUS at 10:05

## 2021-01-01 RX ADMIN — LORAZEPAM 1 MG: 2 INJECTION INTRAMUSCULAR; INTRAVENOUS at 08:36

## 2021-01-01 RX ADMIN — LEVOTHYROXINE SODIUM 50 MCG: 0.05 TABLET ORAL at 06:33

## 2021-01-01 RX ADMIN — METRONIDAZOLE 500 MG: 250 TABLET ORAL at 16:43

## 2021-01-01 RX ADMIN — METRONIDAZOLE 500 MG: 250 TABLET ORAL at 09:16

## 2021-01-01 RX ADMIN — TAMSULOSIN HYDROCHLORIDE 0.4 MG: 0.4 CAPSULE ORAL at 10:05

## 2021-01-01 RX ADMIN — Medication 10 ML: at 13:45

## 2021-01-01 RX ADMIN — PANTOPRAZOLE SODIUM 40 MG: 40 INJECTION, POWDER, FOR SOLUTION INTRAVENOUS at 22:41

## 2021-01-01 RX ADMIN — PIPERACILLIN AND TAZOBACTAM 3.38 G: 3; .375 INJECTION, POWDER, LYOPHILIZED, FOR SOLUTION INTRAVENOUS at 21:54

## 2021-01-01 RX ADMIN — LORAZEPAM 1 MG: 2 INJECTION INTRAMUSCULAR; INTRAVENOUS at 20:36

## 2021-01-01 RX ADMIN — MORPHINE SULFATE 2 MG: 2 INJECTION, SOLUTION INTRAMUSCULAR; INTRAVENOUS at 01:15

## 2021-01-01 RX ADMIN — Medication 10 ML: at 06:13

## 2021-01-01 RX ADMIN — POTASSIUM CHLORIDE 40 MEQ: 750 TABLET, FILM COATED, EXTENDED RELEASE ORAL at 16:08

## 2021-01-01 RX ADMIN — PANTOPRAZOLE SODIUM 40 MG: 40 INJECTION, POWDER, FOR SOLUTION INTRAVENOUS at 08:57

## 2021-01-01 RX ADMIN — TAMSULOSIN HYDROCHLORIDE 0.4 MG: 0.4 CAPSULE ORAL at 09:31

## 2021-01-01 RX ADMIN — Medication 10 ML: at 22:48

## 2021-01-01 RX ADMIN — OXYCODONE 5 MG: 5 TABLET ORAL at 21:12

## 2021-01-01 RX ADMIN — PIPERACILLIN AND TAZOBACTAM 3.38 G: 3; .375 INJECTION, POWDER, LYOPHILIZED, FOR SOLUTION INTRAVENOUS at 05:10

## 2021-01-01 RX ADMIN — LEVOTHYROXINE SODIUM 50 MCG: 0.05 TABLET ORAL at 06:32

## 2021-01-01 RX ADMIN — LORAZEPAM 1 MG: 2 INJECTION INTRAMUSCULAR; INTRAVENOUS at 09:00

## 2021-01-01 RX ADMIN — METRONIDAZOLE 500 MG: 250 TABLET ORAL at 21:15

## 2021-01-01 RX ADMIN — PANTOPRAZOLE SODIUM 40 MG: 40 INJECTION, POWDER, FOR SOLUTION INTRAVENOUS at 08:28

## 2021-01-01 RX ADMIN — LORAZEPAM 1 MG: 2 INJECTION INTRAMUSCULAR; INTRAVENOUS at 21:42

## 2021-01-01 RX ADMIN — METOPROLOL TARTRATE 50 MG: 50 TABLET, FILM COATED ORAL at 09:31

## 2021-01-01 RX ADMIN — POTASSIUM CHLORIDE 10 MEQ: 10 INJECTION, SOLUTION INTRAVENOUS at 03:01

## 2021-01-01 RX ADMIN — Medication 10 ML: at 21:48

## 2021-01-01 RX ADMIN — MORPHINE SULFATE 2 MG: 2 INJECTION, SOLUTION INTRAMUSCULAR; INTRAVENOUS at 19:03

## 2021-01-01 RX ADMIN — ENOXAPARIN SODIUM 40 MG: 100 INJECTION SUBCUTANEOUS at 22:47

## 2021-01-01 RX ADMIN — Medication 5 MG: at 23:08

## 2021-01-01 RX ADMIN — METRONIDAZOLE 500 MG: 250 TABLET ORAL at 09:27

## 2021-01-01 RX ADMIN — Medication 10 ML: at 06:30

## 2021-01-01 RX ADMIN — SODIUM CHLORIDE, POTASSIUM CHLORIDE, SODIUM LACTATE AND CALCIUM CHLORIDE 1000 ML: 600; 310; 30; 20 INJECTION, SOLUTION INTRAVENOUS at 08:57

## 2021-01-01 RX ADMIN — HYDRALAZINE HYDROCHLORIDE 20 MG: 20 INJECTION, SOLUTION INTRAMUSCULAR; INTRAVENOUS at 08:16

## 2021-01-01 RX ADMIN — IOPAMIDOL 100 ML: 755 INJECTION, SOLUTION INTRAVENOUS at 15:38

## 2021-01-01 RX ADMIN — METOPROLOL TARTRATE 50 MG: 50 TABLET, FILM COATED ORAL at 17:19

## 2021-01-01 RX ADMIN — MORPHINE SULFATE 2 MG: 2 INJECTION, SOLUTION INTRAMUSCULAR; INTRAVENOUS at 21:42

## 2021-01-01 RX ADMIN — AMIODARONE HYDROCHLORIDE 0.5 MG/MIN: 50 INJECTION, SOLUTION INTRAVENOUS at 13:08

## 2021-01-01 RX ADMIN — METRONIDAZOLE 500 MG: 250 TABLET ORAL at 08:57

## 2021-01-01 RX ADMIN — Medication 10 ML: at 05:10

## 2021-01-01 RX ADMIN — LEVOTHYROXINE SODIUM 50 MCG: 0.05 TABLET ORAL at 06:25

## 2021-01-01 RX ADMIN — PIPERACILLIN AND TAZOBACTAM 3.38 G: 3; .375 INJECTION, POWDER, LYOPHILIZED, FOR SOLUTION INTRAVENOUS at 21:47

## 2021-01-01 RX ADMIN — MORPHINE SULFATE 2 MG: 2 INJECTION, SOLUTION INTRAMUSCULAR; INTRAVENOUS at 05:03

## 2021-01-01 RX ADMIN — FENTANYL CITRATE 50 MCG: 50 INJECTION, SOLUTION INTRAMUSCULAR; INTRAVENOUS at 17:10

## 2021-01-01 RX ADMIN — LORAZEPAM 1 MG: 2 INJECTION INTRAMUSCULAR; INTRAVENOUS at 12:43

## 2021-01-01 RX ADMIN — Medication 10 ML: at 21:00

## 2021-01-01 RX ADMIN — MORPHINE SULFATE 2 MG: 2 INJECTION, SOLUTION INTRAMUSCULAR; INTRAVENOUS at 08:37

## 2021-01-01 RX ADMIN — MORPHINE SULFATE 2 MG: 2 INJECTION, SOLUTION INTRAMUSCULAR; INTRAVENOUS at 14:46

## 2021-01-01 RX ADMIN — TAMSULOSIN HYDROCHLORIDE 0.4 MG: 0.4 CAPSULE ORAL at 08:27

## 2021-01-01 RX ADMIN — PIPERACILLIN AND TAZOBACTAM 3.38 G: 3; .375 INJECTION, POWDER, LYOPHILIZED, FOR SOLUTION INTRAVENOUS at 06:46

## 2021-01-01 RX ADMIN — Medication 10 ML: at 22:42

## 2021-01-01 RX ADMIN — METRONIDAZOLE 500 MG: 250 TABLET ORAL at 16:44

## 2021-01-01 RX ADMIN — METOPROLOL TARTRATE 50 MG: 50 TABLET, FILM COATED ORAL at 16:44

## 2021-01-01 RX ADMIN — SODIUM CHLORIDE 1000 ML: 9 INJECTION, SOLUTION INTRAVENOUS at 22:37

## 2021-01-01 RX ADMIN — METOPROLOL TARTRATE 50 MG: 50 TABLET, FILM COATED ORAL at 08:28

## 2021-01-01 RX ADMIN — LORAZEPAM 1 MG: 2 INJECTION INTRAMUSCULAR; INTRAVENOUS at 12:56

## 2021-01-01 RX ADMIN — PIPERACILLIN AND TAZOBACTAM 3.38 G: 3; .375 INJECTION, POWDER, LYOPHILIZED, FOR SOLUTION INTRAVENOUS at 22:42

## 2021-01-01 RX ADMIN — ENOXAPARIN SODIUM 40 MG: 100 INJECTION SUBCUTANEOUS at 21:15

## 2021-01-01 RX ADMIN — PANTOPRAZOLE SODIUM 40 MG: 40 INJECTION, POWDER, FOR SOLUTION INTRAVENOUS at 21:18

## 2021-01-01 RX ADMIN — MORPHINE SULFATE 2 MG: 2 INJECTION, SOLUTION INTRAMUSCULAR; INTRAVENOUS at 09:00

## 2021-01-01 RX ADMIN — MORPHINE SULFATE 2 MG: 2 INJECTION, SOLUTION INTRAMUSCULAR; INTRAVENOUS at 21:04

## 2021-01-01 RX ADMIN — METRONIDAZOLE 500 MG: 250 TABLET ORAL at 21:18

## 2021-01-01 RX ADMIN — PANTOPRAZOLE SODIUM 40 MG: 40 INJECTION, POWDER, FOR SOLUTION INTRAVENOUS at 21:47

## 2021-01-01 RX ADMIN — LORAZEPAM 1 MG: 2 INJECTION INTRAMUSCULAR; INTRAVENOUS at 04:55

## 2021-01-01 RX ADMIN — LORAZEPAM 0.5 MG: 2 INJECTION INTRAMUSCULAR; INTRAVENOUS at 13:45

## 2021-01-01 RX ADMIN — METOPROLOL TARTRATE 25 MG: 25 TABLET, FILM COATED ORAL at 09:02

## 2021-01-01 RX ADMIN — METRONIDAZOLE 500 MG: 250 TABLET ORAL at 15:21

## 2021-01-01 RX ADMIN — LEVOTHYROXINE SODIUM 50 MCG: 0.05 TABLET ORAL at 06:26

## 2021-01-01 RX ADMIN — LIDOCAINE HYDROCHLORIDE 10 ML: 10 INJECTION, SOLUTION EPIDURAL; INFILTRATION; INTRACAUDAL; PERINEURAL at 17:04

## 2021-01-01 RX ADMIN — PANTOPRAZOLE SODIUM 40 MG: 40 INJECTION, POWDER, FOR SOLUTION INTRAVENOUS at 08:16

## 2021-01-01 RX ADMIN — LEVOFLOXACIN 750 MG: 750 TABLET, FILM COATED ORAL at 08:27

## 2021-01-01 RX ADMIN — PANTOPRAZOLE SODIUM 40 MG: 40 INJECTION, POWDER, FOR SOLUTION INTRAVENOUS at 09:16

## 2021-01-01 RX ADMIN — LEVOFLOXACIN 750 MG: 750 TABLET, FILM COATED ORAL at 09:16

## 2021-01-01 RX ADMIN — AMIODARONE HYDROCHLORIDE 0.5 MG/MIN: 50 INJECTION, SOLUTION INTRAVENOUS at 01:00

## 2021-01-01 RX ADMIN — SODIUM CHLORIDE 500 ML: 9 INJECTION, SOLUTION INTRAVENOUS at 21:01

## 2021-01-01 RX ADMIN — METRONIDAZOLE 500 MG: 250 TABLET ORAL at 10:05

## 2021-01-01 RX ADMIN — LEVOTHYROXINE SODIUM 50 MCG: 0.05 TABLET ORAL at 05:10

## 2021-01-01 RX ADMIN — PANTOPRAZOLE SODIUM 40 MG: 40 INJECTION, POWDER, FOR SOLUTION INTRAVENOUS at 09:27

## 2021-01-01 RX ADMIN — ENOXAPARIN SODIUM 40 MG: 100 INJECTION SUBCUTANEOUS at 21:48

## 2021-01-01 RX ADMIN — AMIODARONE HYDROCHLORIDE 150 MG: 50 INJECTION, SOLUTION INTRAVENOUS at 13:30

## 2021-01-01 RX ADMIN — METRONIDAZOLE 500 MG: 250 TABLET ORAL at 09:31

## 2021-01-01 RX ADMIN — PANTOPRAZOLE SODIUM 40 MG: 40 INJECTION, POWDER, FOR SOLUTION INTRAVENOUS at 09:31

## 2021-02-18 PROBLEM — K81.0 ACUTE CHOLECYSTITIS: Status: ACTIVE | Noted: 2021-01-01

## 2021-02-18 PROBLEM — K92.2 GIB (GASTROINTESTINAL BLEEDING): Status: ACTIVE | Noted: 2021-01-01

## 2021-02-18 PROBLEM — G93.41 ACUTE METABOLIC ENCEPHALOPATHY: Status: ACTIVE | Noted: 2021-01-01

## 2021-02-18 PROBLEM — R65.20 SEVERE SEPSIS (HCC): Status: ACTIVE | Noted: 2021-01-01

## 2021-02-18 PROBLEM — E87.20 LACTIC ACIDOSIS: Status: ACTIVE | Noted: 2021-01-01

## 2021-02-18 PROBLEM — R10.11 ABDOMINAL PAIN, ACUTE, RIGHT UPPER QUADRANT: Status: ACTIVE | Noted: 2021-01-01

## 2021-02-18 PROBLEM — R10.11 RUQ PAIN: Status: ACTIVE | Noted: 2021-01-01

## 2021-02-18 PROBLEM — A41.9 SEVERE SEPSIS (HCC): Status: ACTIVE | Noted: 2021-01-01

## 2021-02-18 PROBLEM — D72.829 LEUKOCYTOSIS: Status: ACTIVE | Noted: 2021-01-01

## 2021-02-18 PROBLEM — E87.6 HYPOKALEMIA: Status: ACTIVE | Noted: 2021-01-01

## 2021-02-18 PROBLEM — R10.13 ACUTE EPIGASTRIC PAIN: Status: ACTIVE | Noted: 2021-01-01

## 2021-02-18 PROBLEM — E80.6 HYPERBILIRUBINEMIA: Status: ACTIVE | Noted: 2021-01-01

## 2021-02-18 PROBLEM — I48.91 ATRIAL FIBRILLATION (HCC): Status: ACTIVE | Noted: 2021-01-01

## 2021-02-18 NOTE — ED TRIAGE NOTES
Pt to ED via EMS for lethargy and vomiting coffee ground emesis that started today. Pt from assisted living, wheelchair bound and per staff walker viera. Pt reports abdominal pain for 9 days. Epigastric and right upper quadrant tender to palpation

## 2021-02-18 NOTE — Clinical Note
Status[de-identified] INPATIENT [101]   Type of Bed: Telemetry [19]   Inpatient Hospitalization Certified Necessary for the Following Reasons: 3. Patient receiving treatment that can only be provided in an inpatient setting (further clarification in H&P documentation)   Admitting Diagnosis: Severe sepsis [facility]) [7887592]   Admitting Diagnosis: Lactic acidosis [312506]   Admitting Diagnosis: Acute cholecystitis [575. 0. ICD-9-CM]   Admitting Diagnosis: RUQ pain [559360]   Admitting Diagnosis: Acute epigastric pain [0243784]   Admitting Diagnosis: Abdominal pain, acute, right upper quadrant [2022540]   Admitting Diagnosis: Hyperbilirubinemia [347264]   Admitting Diagnosis: Hypokalemia [168541]   Admitting Diagnosis: Leukocytosis [410437]   Ad[facility] Diagnosis: [facility] [427.31. ICD-9-CM]   Admitting Diagnosis: GIB (gastrointestinal bleeding) [847298]   Admitting Diagnosis: Acute metabolic encephalopathy [3447948]   Admitting Physician: Annetta Batista [[id number]]   Attending Physician: Karri Rios   Estimated Length of Stay: 3-4 Midnights   [facility]de[facility]identified[facility]

## 2021-02-18 NOTE — ED PROVIDER NOTES
Date of Service: 
2/18/2021 Patient: 
Mahnaz Khan Chief Complaint: 
Vomiting and Altered mental status HPI: 
Mahnaz Khan is a 80 y.o.  male who presents for evaluation of vomiting and lethargy. Per patient, he states that he has been having some abdominal discomfort. There is also been some reported hematemesis. Patient resides in assisted living with his wife. Patient is wheelchair-bound and has some chronic contractures. Patient arrives here speaking in yes no answers and appears ill. He is unable to describe his discomfort other than he has some. No other information is immediately available. Past Medical History:  
Diagnosis Date  Contact dermatitis and other eczema, due to unspecified cause  Hypercholesterolemia  Hypertension Past Surgical History:  
Procedure Laterality Date  HX APPENDECTOMY [address] [facility]  
 for BPH  
 HX UROLOGICAL Family History:  
Problem Relation Age of Onset  Cancer Mother Social History Socioeconomic History  Marital status:  Spouse name: Not on file  Number of children: Not on file  Years of education: Not on file  Highest education level: Not on file Occupational History  Not on file Social Needs  Financial resource strain: Not on file  Food insecurity Worry: Not on file Inability: Not on file  Transportation needs Medical: Not on file Non-medical: Not on file Tobacco Use  Smoking status: Never Smoker  Smokeless tobacco: Never Used Substance and Sexual Activity  Alcohol use: Yes Comment: 2 OZ A DAY  Drug use: No  
 Sexual activity: Never Lifestyle  Physical activity Days per week: Not on file Minutes per session: Not on file  Stress: Not on file Relationships  Social connections Talks on phone: Not on file Gets together: Not on file Attends Jain service: Not on file Active member of club or organization: Not on file Attends meetings of clubs or organizations: Not on file Relationship status: Not on file  Intimate partner violence Fear of current or ex partner: Not on file Emotionally abused: Not on file Physically abused: Not on file Forced sexual activity: Not on file Other Topics Concern  Not on file Social History Narrative  Not on file ALLERGIES: Patient has no known allergies. Review of Systems Unable to perform ROS: Mental status change Vitals:  
 02/18/21 1356 02/18/21 1358 Pulse: 94 94 Resp: 16 18 Temp: 98.4 °F (36.9 °C) 98.4 °F (36.9 °C) SpO2: 93% 94% Weight: 83.9 kg (185 lb) 83.9 kg (185 lb) Physical Exam 
Vitals signs and nursing note reviewed. Constitutional:   
   General: He is not in acute distress. Appearance: He is ill-appearing. He is not toxic-appearing or diaphoretic. HENT:  
   Head: Normocephalic and atraumatic. Mouth/Throat:  
   Mouth: Mucous membranes are moist.  
Cardiovascular:  
   Rate and Rhythm: Normal rate. Pulmonary:  
   Effort: Pulmonary effort is normal. No respiratory distress. Abdominal:  
   General: Abdomen is protuberant. Tenderness: There is abdominal tenderness. Musculoskeletal:     
   General: Swelling present. Comments: Chronic contractures Skin: 
   General: Skin is warm. Neurological:  
   Mental Status: He is alert and oriented to person, place, and time. Psychiatric:     
   Mood and Affect: Mood normal.  
 
  
 
[facility] 
ED Course as of Feb 18 2016 u Feb 18, 2021  
1418 EKG 1357 Afib 89 Normal axis No acute ischemic changes [GG] 1549 Lactic acid(!!): 2.2 [GG] 1549 Potassium(!!): 2.7 [GG] 1549 WBC(!): 16.0 [GG] 1634 WBC(!): 16.0 [GG] 1727 HEPATIC FUNCTION PANEL [GG] ED Course User Index [GG] Kathryn Look, DO  
 
VITAL SIGNS: 
 Patient Vitals for the past 4 hrs: 
 Pulse Resp BP SpO2  
02/18/21 1900 89 (!) 31 (!) 140/70 96 % 02/18/21 1845 87 23 120/63 96 % 02/18/21 1830 79 23 (!) 123/57 (!) 89 % 02/18/21 1815 70 23 121/72 93 % 02/18/21 1800 81 24 124/62 90 % 02/18/21 1745 86 20 129/89 96 % 02/18/21 1730 77 23 (!) 126/54 91 % 02/18/21 1715 84 29 131/73 91 % 02/18/21 1645 96 24 (!) 155/80 92 % 02/18/21 1630 89 23 (!) 155/60 93 % LABS: 
Recent Results (from the past 6 hour(s)) CBC WITH AUTOMATED DIFF Collection Time: 02/18/21  2:23 PM  
Result Value Ref Range WBC 16.0 (H) 4.1 - 11.1 K/uL  
 RBC 5.52 4.10 - 5.70 M/uL  
 HGB 14.7 12.1 - 17.0 g/dL HCT 43.0 36.6 - 50.3 % MCV 77.9 (L) 80.0 - 99.0 FL  
 MCH 26.6 26.0 - 34.0 PG  
 MCHC 34.2 30.0 - 36.5 g/dL  
 RDW 19.3 (H) 11.5 - 14.5 % PLATELET 667 084 - 862 K/uL MPV 12.0 8.9 - 12.9 FL  
 NRBC 0.0 0  WBC ABSOLUTE NRBC 0.00 0.00 - 0.01 K/uL NEUTROPHILS 91 (H) 32 - 75 % LYMPHOCYTES 3 (L) 12 - 49 % MONOCYTES 5 5 - 13 % EOSINOPHILS 0 0 - 7 % BASOPHILS 0 0 - 1 % IMMATURE GRANULOCYTES 1 (H) 0.0 - 0.5 % ABS. NEUTROPHILS 14.5 (H) 1.8 - 8.0 K/UL  
 ABS. LYMPHOCYTES 0.5 (L) 0.8 - 3.5 K/UL  
 ABS. MONOCYTES 0.8 0.0 - 1.0 K/UL  
 ABS. EOSINOPHILS 0.0 0.0 - 0.4 K/UL  
 ABS. BASOPHILS 0.0 0.0 - 0.1 K/UL  
 ABS. IMM. GRANS. 0.2 (H) 0.00 - 0.04 K/UL  
 DF SMEAR SCANNED    
 RBC COMMENTS NORMOCYTIC, NORMOCHROMIC METABOLIC PANEL, BASIC Collection Time: 02/18/21  2:23 PM  
Result Value Ref Range Sodium 141 136 - 145 mmol/L Potassium 2.7 (LL) 3.5 - 5.1 mmol/L Chloride 105 97 - 108 mmol/L  
 CO2 30 21 - 32 mmol/L Anion gap 6 5 - 15 mmol/L Glucose 171 (H) 65 - 100 mg/dL BUN 22 (H) 6 - 20 MG/DL Creatinine 1.28 0.70 - 1.30 MG/DL  
 BUN/Creatinine ratio 17 12 - 20 GFR est AA >60 >60 ml/min/1.73m2 GFR est non-AA 53 (L) >60 ml/min/1.73m2  Calcium 8.6 8.5 - 10.1 MG/DL  
TROPONIN I  
 Collection Time: 02/18/21  2:23 PM  
Result Value Ref Range Troponin-I, Qt. <0.05 <0.05 ng/mL LACTIC ACID Collection Time: 02/18/21  2:23 PM  
Result Value Ref Range Lactic acid 2.2 (HH) 0.4 - 2.0 MMOL/L  
TYPE & SCREEN Collection Time: 02/18/21  2:23 PM  
Result Value Ref Range Crossmatch Expiration 02/21/2021,2359 ABO/Rh(D) O POSITIVE Antibody screen NEG PROTHROMBIN TIME + INR Collection Time: 02/18/21  2:23 PM  
Result Value Ref Range INR 1.3 (H) 0.9 - 1.1 Prothrombin time 13.6 (H) 9.0 - 11.1 sec LIPASE Collection Time: 02/18/21  2:23 PM  
Result Value Ref Range Lipase 127 73 - 393 U/L MAGNESIUM Collection Time: 02/18/21  2:23 PM  
Result Value Ref Range Magnesium 1.5 (L) 1.6 - 2.4 mg/dL HEPATIC FUNCTION PANEL Collection Time: 02/18/21  2:23 PM  
Result Value Ref Range Protein, total 6.0 (L) 6.4 - 8.2 g/dL Albumin 2.6 (L) 3.5 - 5.0 g/dL Globulin 3.4 2.0 - 4.0 g/dL A-G Ratio 0.8 (L) 1.1 - 2.2 Bilirubin, total 2.1 (H) 0.2 - 1.0 MG/DL Bilirubin, direct 1.3 (H) 0.0 - 0.2 MG/DL Alk. phosphatase 114 45 - 117 U/L  
 AST (SGOT) 26 15 - 37 U/L  
 ALT (SGPT) 19 12 - 78 U/L  
SAMPLES BEING HELD Collection Time: 02/18/21  2:32 PM  
Result Value Ref Range SAMPLES BEING HELD 1SST,1RED COMMENT Add-on orders for these samples will be processed based on acceptable specimen integrity and analyte stability, which may vary by analyte. IMAGING: 
US ABD LTD Final Result Distended sludge-filled gallbladder with wall thickening and  
questionable intramural fluid. No cholelithiasis seen. No biliary dilation. CT ABD PELV W WO CONT Final Result 1. No active GI bleeding. 2. Suspect acute cholecystitis. 3. Gastric distention. 4. Chronic bladder outlet obstruction. XR CHEST PORT Final Result No acute process. Medications During Visit: 
Medications potassium chloride 10 mEq in 100 ml IVPB (has no administration in time range)  
piperacillin-tazobactam (ZOSYN) 3.375 g in 0.9% sodium chloride (MBP/ADV) 100 mL MBP (has no administration in time range)  
sodium chloride 0.9 % bolus infusion 500 mL (0 mL IntraVENous IV Completed 2/18/21 1545) iopamidoL (ISOVUE-370) 76 % injection 100 mL (100 mL IntraVENous Given 2/18/21 1538) magnesium sulfate 2 g/50 ml IVPB (premix or compounded) (0 g IntraVENous IV Completed 2/18/21 1730) potassium chloride 10 mEq in 100 ml IVPB (0 mEq IntraVENous IV Completed 2/18/21 1730) ondansetron (ZOFRAN) injection 4 mg (4 mg IntraVENous Given 2/18/21 1754) DECISION MAKING: 
Leah Sheikh is a 80 y.o. male who comes in as above. Upon arrival, patient does appear somewhat somnolent but upon reevaluation he is much more awake. He never is having any issues or questions. He had one episode of vomiting during the ultrasound which resolved after administration of Zofran. Labs and imaging are as above. Patient's remained hemodynamically stable the entire time he has been here. General surgery has been contacted. At this time because of his underlying symptoms are on known however it may be related to his gallbladder. At this time patient started on antibiotics. Surgery will continue to follow. Patient will be admitted to the hospital service. IMPRESSION: 
1. Hypokalemia 2. Non-intractable vomiting with nausea, unspecified vomiting type 3. Abdominal pain, epigastric 4. Gall bladder inflammation 5. Elevated bilirubin DISPOSITION: 
Admitted Procedures Perfect Serve Consult for Admission 8:16 PM 
 
ED Room Number: ER07/07 Patient Name and age:  Leah Sheikh 80 y.o.  male Working Diagnosis: 1. Hypokalemia 2. Non-intractable vomiting with nausea, unspecified vomiting type 3. Abdominal pain, epigastric 4. Gall bladder inflammation 5. Elevated bilirubin COVID-19 Suspicion:  no 
Sepsis present:  no  Reassessment needed: no 
Code Status:  Full Code Readmission: no 
Isolation Requirements:  no 
Recommended Level of Care:  telemetry Department:[facility] ED - [phone] Other:  Low k, vomiting. Has some GB abnormalities. Gen surgery aware and will follow. Hes awake and alert. Able to stay

## 2021-02-19 NOTE — ED NOTES
Patient returned from IR. R PIV per Angio infiltrated during case. L PIV remains intact. Patient remains on room air. Drain noted to R abdomen. Per bedside report, let drain to gravity, does not need to be flushed.

## 2021-02-19 NOTE — PROGRESS NOTES
Discussed with wife Wally Given [facility] [phone]) and daughter Cary Ortiz (prefers cell [phone], home [phone]) Note dictated. From provider standpoint, discussed with Dr. Conner Garcia (IR) and Dr. Giuseppe Meade (Attending of record). Plan will be for HIDA scan to confirm nonfilling first.  If appropriate, percutaneous cholecystostomy, with hope to manage cholecystitis non-operatively. Given medical history, wife and daughter hope surgery is last resort.  
 
Osvaldo Baer MD

## 2021-02-19 NOTE — H&P
[address] [place] [address] 
[phone] Hospitalist Admission Note NAME: Azam Anderson :  1/10/1930 MRN:  [id number] Date/Time:  2021 7:38 PM 
 
Patient PCP: Jigar Momin MD 
 
Emergency Contact:   
Extended Emergency Contact Information Primary Emergency Contact: Zafar Beckman Home Phone: [phone] Relation: Spouse Code: Full Code Isolation Precautions: There are currently no Active Isolations Subjective: CHIEF COMPLAINT: Abdominal pain HISTORY OF PRESENT ILLNESS:    
Mr. Carmen Gu is a 80 y.o. male who is wheelchair bound with history that includes HTN and HLD  Presents from [facility] with constant moderate-severe RUQ and epigastric abdominal pain that started about 9-10 days ago and has been worsening associated with nausea, vomiting. He appears to have had coffee ground emesis earlier today. Pain appears to be due to possibly cholecystitis. Also there was mention of altered mental status from the SUE but this seems to be waxing and waning. When the ER doctor saw him and when he was seen by nursing he was alert and oriented x3 but when I saw the patient he was very somnolent and was not answering questions other than grunting when I try to ask questions. I was not able to provide review of system or history from the. However, the nurse does say that he can ask for things what he wants from time to time. Call the number listed above to speak with his wife but was unable to reach her does not seem to be a working number at this time. In ER he was found to meet criteria for SIRS based WBC and respiratory rate along with possible cholecystitis appears that he is septic with a mild lactic acidosis of 2.2. He was started on Zosyn. Also found to be hypokalemic 2.7 POA and with hyperbilirubinemia. Chest x-ray was unremarkable but CT of the abdomen showed possible acute cholecystitis and gastric distention. Surgery is aware of the patient. Also with what appears to be chronic bladder outlet obstruction in which the ER nurses have had a difficult time trying to insert urinary catheter. No Known Allergies Prior to Admission medications Medication Sig Start Date End Date Taking? Authorizing Provider  
allopurinol (ZYLOPRIM) 100 mg tablet TAKE 1 TABLET BY MOUTH TWICE A DAY TO PREVENT GOUT 8/21/17   Fer Denney MD  
simvastatin (ZOCOR) 20 mg tablet TAKE 1 TABLET BY MOUTH EVERY DAY 2/12/17   Fer Denney MD  
furosemide (LASIX) 20 mg tablet TAKE 1 TABLET BY MOUTH IN THE MORNING ON MON, WED, FRI FOR FLUID 1/28/16   Fer Denney MD  
triamterene-hydrochlorothiazide (MAXZIDE) 75-50 mg per tablet TAKE 1 TABLET BY MOUTH EVERY DAY 5/4/15   Fer Denney MD  
warfarin (COUMADIN) 3 mg tablet Take 1 Tab by mouth daily. 7/9/14   Fer Denney MD  
warfarin (COUMADIN) 1 mg tablet Take 1 Tab by mouth daily. 7/7/14   Fer Denney MD  
metoprolol (LOPRESSOR) 50 mg tablet TAKE 1 TABLET BY MOUTH TWICE A DAY 6/28/14   Fer Denney MD  
enoxaparin (LOVENOX) 80 mg/0.8 mL injection 80 mg by SubCUTAneous route every twelve (12) hours. 10/17/13   Juli Pollock MD  
doxycycline (VIBRAMYCIN) 50 mg capsule Take 50 mg by mouth two (2) times a day. Provider, Historical  
magnesium oxide (MAG-OX) 400 mg tablet Take 1 Tab by mouth daily. 10/28/10   Bernard Teran MD  
PYRIDOXINE HCL (VITAMIN B-6 PO) Take  by mouth. Provider, Historical  
terazosin (HYTRIN) 5 mg capsule Take 5 mg by mouth nightly.     Provider, Historical  
 CALCIUM CARBONATE/VITAMIN D2 (CALCIUM + VITAMIN D PO) Take  by mouth. Provider, Historical  
MULTIVITAMINS (MULTIVITAMIN PO) Take  by mouth. Provider, Historical  
 
 
Past Medical History:  
Diagnosis Date  Contact dermatitis and other eczema, due to unspecified cause  Hypercholesterolemia  Hypertension Past Surgical History:  
Procedure Laterality Date  HX APPENDECTOMY [address] [facility]  
 for BPH  
 HX UROLOGICAL Social History Tobacco Use  Smoking status: Never Smoker  Smokeless tobacco: Never Used Substance Use Topics  Alcohol use: Yes Comment: 2 OZ A DAY Family History Problem Relation Age of Onset  Cancer Mother [address] and medications were reviewed. Review of Systems (14 point ROS): 
Unable to obtain ROS from patient due to his altered mental status. Objective:   
  
Visit Vitals BP (!) 140/70 Pulse 89 Temp 98.4 °F (36.9 °C) Resp (!) 31 Wt 83.9 kg (185 lb) SpO2 96% BMI 31.76 kg/m² Exam:  
 
Physical Exam: 
 
General: Appears chronically sick. Somewhat obtunded not answering questions but otherwise no acute distress. Head: Normocephalic, atraumatic Eyes: Unable to examine because patient did not allow me to open his eyes ENT: Unable to examine he was not allowing me to look in his mouth Neck: supple, does not appear to have tenderness Lungs: CTA with good BS and normal effort anteriorly Heart: S1-S2, irregularly irregular. Abd: Epigastric and right upper quadrant pain to palpation with some grunting while palpating. Ext: no cyanosis, bilateral 1+ LE pitting edema Pulses: 2+ and symmetric Skin: Skin color, texture, turgor normal. No rashes or lesions Neuro: Does not appear to be oriented at this time and is somnolent/obtunded. Psych: Not anxious, cooperative, normal affect Labs: 
 
Recent Labs  
  02/18/21 
1423 WBC 16.0*  
HGB 14.7 HCT 43.0  Recent Labs 02/18/21 
1423   
K 2.7*  
 CO2 30 * BUN 22* CREA 1.28  
CA 8.6 MG 1.5* ALB 2.6* TBILI 2.1* ALT 19 Lab Results Component Value Date/Time Glucose (POC) 107 10/14/2013 07:07 PM  
 Glucose (POC) 81 10/27/2009 12:04 AM  
 Glucose (POC) 257 (H) 10/26/2009 05:23 PM  
 
No results for input(s): PH, PCO2, PO2, HCO3, FIO2 in the last 72 hours. Recent Labs  
  02/18/21 
1423 INR 1.3* Radiology and EKG reviewed:    
Ct Abd Pelv W Wo Cont Result Date: 2/18/2021 1. No active GI bleeding. 2. Suspect acute cholecystitis. 3. Gastric distention. 4. Chronic bladder outlet obstruction. [facility] Result Date: 2/18/2021 Distended sludge-filled gallbladder with wall thickening and questionable intramural fluid. No cholelithiasis seen. No biliary dilation. Xr Chest [facility] Result Date: 2/18/2021 No acute process. I personally reviewed and interpreted the imaging studies and agree with official reading. **Chart reviewed in [facility]** Assessment/Plan:   
   
Severe sepsis likely due to Acute cholecystitis / RUQ and epigastric abdominal pain / Hyperbilirubinemia / Lactic acidosis / Leukocytosis:  Admit for further workup and treatment. NPO.  IVF. I&Os. Sepsis bundle. Supportive care. Antiemetic. Pain control with IV morphine as needed severe pain. .  Consider NG tube if vomiting is severe. IV antibiotics will continue with Zosyn. Crispin Hilt Labs: CMP, CBC/DIFF, Lipase, Monitor, LFTs, INR/PTT. BCXs. Rapid CoV-19. Imaging:  Consider MRCP Consult(s): GI and surgical consults. Possible GIB likely upper with reported coffee ground emesis:  Hgb stable. NPO.  IVF. PPI. Type & Screen, serial H&H, PT.INR.  GI consult. Mechanical DVT prophylaxis only due to bleeding. Hypokalemia:  Replace and monitor. Acute encephalopathy likely metabolic from sepsis?:  Treat above and monitor. Will check UDS, ammonia level, TSH, and will get CT of head if not improving but this appears to be related to sepsis. Christie Singh AFIB: Appears to be chronic and seems to been on warfarin which for the time being I will be holding until we can rule out a GI bleed. HTN:  PRN BP meds. Hypothyroidism: Check TSH and continue home dose of levothyroxine. Body mass index is 31.76 kg/m².: 30.0 - 39.9 Obese Risk of deterioration: high Total time spent with patient: 79 Minutes **I personally saw and examined the patient during this time period** Discussed:  Care Plan discussed with:  [x]Patient  []Family unable to reach wife. []Care Giver  [x]ED Doc  [x]RN  []Specialist  []Care Manager Prophylaxis:  SCD's and H2B/PPI Probable Disposition:  Back to SUE possibly with HH junior if he has surgery Patient was seen:  Before midnight 2/18/2021  
 
 
        
___________________________________________________ Admitting Physician: Milana Gregg MD

## 2021-02-19 NOTE — PROGRESS NOTES
0700 
Bedside and Verbal shift change report given to Roxy Flores (oncoming nurse) by Marjan Del Rosario (offgoing nurse). Report included the following information SBAR, Kardex, Procedure Summary, Intake/Output, MAR, Accordion, Recent Results and Cardiac Rhythm Afib. Initial assessment done. AOx1. Pain when touching the abdomen. Will continue to monitor. [address] CMP and INR drawn and sent to the lab. Dr. Tamra Christianson at the bedside, ordered to keep patient NPO.  
 
1400 Patient on nuclear med for HIDA scan. [address] Bedside and Verbal shift change report given to [address] (oncoming nurse) by Rusty Loo RN (offgoing nurse). Report included the following information SBAR, Kardex, Procedure Summary, Intake/Output, MAR, Accordion and Recent Results.

## 2021-02-19 NOTE — ED NOTES
Bedside and Verbal shift change report given to Ajit Santos (oncoming nurse) by Suzanne Canada (offgoing nurse). Report included the following information SBAR, Kardex, ED Summary and MAR.

## 2021-02-19 NOTE — ED NOTES
Received call from nurse at [facility] requesting update on pt. Update given and questions answered. She states pt received his first COVID vaccine on 2/15/21. If any more information needed, call assisted facility nurse at [phone]

## 2021-02-19 NOTE — PROGRESS NOTES
[facility]: MED RECONCILIATION Information obtained from: 
Lara Vega [place]/ Medication list, Patent's wife at  Comment: 
Baby aspirin was not on the med list from facility but wife states patient takes it. Allergies: Patient has no known allergies. Prior to Admission Medications:  
 
Medication Documentation Review Audit Reviewed by Tiny Acevedo PHARMD (Pharmacist) on 02/18/21 at 2101 Medication Sig Documenting Provider Last Dose Status Taking?  
allopurinol (ZYLOPRIM) 100 mg tablet TAKE 1 TABLET BY MOUTH TWICE A DAY TO PREVENT GOUT Natalia Douglas MD  Active Yes  
aspirin delayed-release 81 mg tablet Take 81 mg by mouth nightly. Provider, Historical  Active Yes  
escitalopram oxalate (LEXAPRO) 20 mg tablet Take 20 mg by mouth daily. Provider, Historical  Active Yes  
furosemide (LASIX) 20 mg tablet TAKE 1 TABLET BY MOUTH IN THE MORNING ON MON, WED, FRI FOR FLUID Natalia Douglas MD  Active Yes  
levothyroxine (SYNTHROID) 50 mcg tablet Take 50 mcg by mouth Daily (before breakfast). Provider, Historical  Active Yes  
loperamide (IMODIUM) 2 mg capsule Take 2 mg by mouth every six (6) hours as needed for Diarrhea. Provider, Historical  Active Yes  
metoprolol tartrate (LOPRESSOR) 25 mg tablet Take 25 mg by mouth two (2) times a day. Provider, Historical  Active Yes  
simvastatin (ZOCOR) 20 mg tablet Take 20 mg by mouth nightly. Provider, Historical  Active Yes  
terazosin (HYTRIN) 10 mg capsule Take 10 mg by mouth two (2) times a day. Provider, Historical  Active Yes [address] [facility]   Contact: 1399

## 2021-02-19 NOTE — CONSULTS
[address] Name:  Wilhemina Osler 
MR#:  [id number] :  01/10/1930 ACCOUNT #:  [de-identified] DATE OF SERVICE:  2021 PRIMARY CARE PROVIDER:  Gaetano Pedroza MD 
 
HOSPITALIST OF THE RECORD:  Twin Pearson MD 
 
REASON FOR CONSULTATION:  Surgical evaluation for sepsis and cholecystitis. HISTORY OF PRESENT ILLNESS:  The patient is a pleasant 80-year-old male, who lives in a facility with his wife. The patient is wheelchair bound due to polio and atrial fibrillation, being managed without anticoagulation at this time. Daughter and wife are available on phone, and I discussed with them his care. Recently, the patient began to have severe epigastric abdominal pain with poor appetite. Symptoms have been worsened including nausea and vomiting. He appeared to have coffee-ground emesis yesterday. He was brought to the Emergency Department for evaluation. The patient was somnolent in the Emergency Department. SIRS criteria were met apparently with tachypnea, leukocytosis, lactic acidosis of 2.2, and CT scan and ultrasound consistent with cholecystitis. The patient was also found to be hypokalemic with hyperbilirubinemia. Chest x-ray is unremarkable. The patient also had a chronic bladder outlet obstruction with very large bladder on CT scan. Today, his white blood cell count is more elevated at 18. 3. His INR got from 1.3 to 1.9 despite the fact that the patient has not been on Coumadin anticoagulation. BUN and creatinine are 24 and 1.05 respectively. We were able to reverse his hypokalemia and the lactic acid is improved at 1.8 from last night. Consult was placed for cholecystitis. REVIEW OF SYSTEMS:  Unable to be obtained due to the patient's mental status. PAST MEDICAL HISTORY: 
1. Hypercholesterolemia. 2.  Hypertension. 3.  Vascular disease. 4.  Gout. 5.  See HPI for details of polio and immobility.  
 
PAST SURGICAL HISTORY: 
 1.  Appendectomy. 2.  Prostatectomy. 3.  History of massive urologic occlusion. PAST SOCIAL HISTORY:  He is a never smoker. He drinks alcohol daily. He is . He lives in a facility to have assistance with activities of daily living. FAMILY HISTORY:  Cancer. HOME MEDICATIONS: 
1. Allopurinol. 2.  Simvastatin. 3.  Furosemide. 4.  Triamterene-hydrochlorothiazide. 5.  Historical use of warfarin with no report of it currently being taken. 6.  Doxycycline. 7.  Magnesium oxide. 8.  Terazosin. PHYSICAL EXAMINATION: 
VITAL SIGNS:  Temperature 98.5, pulse is 111, blood pressure is 143/75, oxygen saturation 94%, respirations 27. CONSTITUTIONAL:  He is an elderly male, who is in mild distress. The patient has his eyes closed at bedside, but does not open his eyes on being asked some questions. He can answer simple yes and no questions. HEENT:  Normocephalic, atraumatic. NECK:  Supple. Trachea appears midline. CHEST:  Clear. He does not have labored breathing. HEART:  Rate is tachycardic and regular per monitor. ABDOMEN:  Distended. He is tender in the right abdomen. No rebound or guarding. No evidence of hernia. MUSCULOSKELETAL:  No clubbing, cyanosis, or edema. SKIN:  Clear with multiple age-associated actinic keratosis. NEUROLOGIC:  Appears to be grossly nonfocal, but cannot get the patient to open his eyes. PSYCHIATRIC:  Unaware of insight into current medical status given his mental status. RADIOLOGY:  See HPI. CT of the abdomen and pelvis done yesterday was done with and without contrast.  The patient has evidence of acute cholecystitis with, I believe, a distended stomach. Ultrasound of the abdomen done yesterday revealed distended sludge-filled gallbladder with wall thickening and questionable intramural fluid with no gallstones seen. There is no evidence of biliary dilatation on the ultrasound with no reported damage of the bile duct. Gallbladder wall measures approximately 6.2 mm, common bile duct was 3.02 mm. ASSESSMENT:  Sepsis with evidence of cholecystitis. The patient had multiple medical conditions and is not currently an acceptable surgical candidate. PLAN:  I was able to discuss the patient's current clinical status and options for care with his daughter, Eliezer Walters, and his wife, Cathy Roman. His wife would like the daughter, Eliezer Walters, to be called, as the wife is hard of hearing and has a hard time understanding things over the phone. With his sepsis and age, he has clearly likely been affected by the gallbladder. Unfortunately, anesthetic is not recommended at this time due to his rapid heart rhythm. I think it is probably more reasonable at this juncture to proceed with continued antibiotics and obtaining a HIDA scan. If the HIDA scan shows non-filling of gallbladder and is more consistent with cholecystitis, I think it is reasonable to proceed with percutaneous cholecystostomy and continue to work on his heart rhythm. If he shows control and his heart rhythm becomes more regular, that will allow us time to reassess his clinical status and see if he will be more amenable to cholecystectomy. In discussion with the daughter and the wife, they would like to avoid general anesthetic and surgery if at all possible to reduce the risk of having complications either from giving him anesthesia or undergoing a surgical procedure, which I think is reasonable. We will continue to follow along with you. At this juncture, HIDA scan is ordered and we will await for the results. It is a pleasure to participate in his care. Jose L Young MD 
 
 
[id number] 
D:  02/19/2021 12:15 
T:  02/19/2021 18:10 
JOB #:  [id number] CC:  Rene Shoulder, MD Maya Nyhan, MD Verlena Cota, MD

## 2021-02-19 NOTE — ED NOTES
Pt opened eyes, responded to primary RN. Denied having nausea or pain. Was able to answer question of when asked for his name and where he's from. Verbalized desire for water. Informed him that at this time he is not to have anything to drink or eat at this time.

## 2021-02-19 NOTE — ED NOTES
Bedside and Verbal shift change report given to Leeanne Dubin (oncoming nurse) by Sena Tyler RN (offgoing nurse). Report included the following information SBAR, MAR and Recent Results.

## 2021-02-19 NOTE — ED NOTES
Patient returned from Nuclear Medicine. Per tech, there are plans for patient to possibly go to angio today for percutaneous cholecystostomy

## 2021-02-19 NOTE — PROGRESS NOTES
[facility] [place] [facility] 
[address] 
[phone] Medical Progress Note NAME: Azam Anderson :  1/10/1930 MRM:  [id number] Date/Time: 2021  3:48 PM 
 
 
 
  
Assessment / Plan: #Sepsis: Most likely source is gallbladder; however, also with mildly abnl UA. Pressures acceptable at present. In [facility]  2/2 sepsis. - Appreciate Surg recs 
  - HIDA, then IR per pérez today - May need ERCP 
  - Pip/tazo  -  
  - Hopeful this can be managed without surgery - F/u UCx, BCx 
 - Continue to assess fluid responsiveness; UOP and MAPs to goal 
 
#AFwRVR: No known hx. INR elevated, but this is most likely e/o coagulopathy in s/o above as pt does not take warfarin. Presently hemodynamically stable - Treat sepsis; treat volume 
 - Hold on amio unless hemodynamic decompensation #HAMLET: Likely pre-renal in s/o above. Improving with fluid resuscitation 
 - Continue as needed fluid resuscitation #Urinary Retention: > 500cc pvr. Known BPH. Potential source of infection. Doubt obstructive hamlet 
 - Singh today 
 - f/u ucx #Hypokalemia: Repleted #Elevated INR: Favor DIC/Coagulopathy in s/o above. No utility in reversing #Hypothyroidism: 
 - Cont home LT4 Total time spent with patient: 30 Minutes Care Plan discussed with: Patient and Family Discussed:  Code Status and Care Plan Prophylaxis:  Lovenox Disposition:  Home w/Family 
        
___________________________________________________ Attending Physician: George Jordan MD  
 
  
Subjective: Chief Complaint:  C/o abd pain. Says \"Let me die\". Discussed this with family who feel he is very depressed and would actually want to live as long as he doesn't have any surgery ROS: 
(bold if positive, if negative) Tolerating PT  Tolerating Diet Objective:  
 
 
Vitals: Last 24hrs VS reviewed since prior progress note. Most recent are: 
 
Visit Vitals /86 Pulse (!) 119 Temp 98.1 °F (36.7 °C) Resp 26 Wt 83.9 kg (185 lb) SpO2 96% BMI 31.76 kg/m² SpO2 Readings from Last 6 Encounters:  
02/19/21 96% 01/16/14 97% 11/14/13 96% 10/18/13 97% 10/17/13 95% 10/14/13 99% Intake/Output Summary (Last 24 hours) at 2/19/2021 1548 Last data filed at 2/18/2021 2255 Gross per 24 hour Intake 850 ml Output  Net 850 ml Exam:  
 
Physical Exam: 
 
Gen:  Well-developed, well-nourished, in no acute distress HEENT:  Dry MM Neck:  Supple, without masses, thyroid non-tender Resp:  No accessory muscle use, clear breath sounds without wheezes rales or rhonchi 
Card:  No murmurs, normal S1, S2 without thrills, bruits or peripheral edema Abd:  Soft, mild TTP RUQ;, non-distended, normoactive bowel sounds are present Musc:  No cyanosis or clubbing Skin:  No rashes or ulcers, skin turgor is good Neuro:  Cranial nerves 3-12 are grossly intact,  strength is 5/5 bilaterally and dorsi / plantarflexion is 5/5 bilaterally, follows commands appropriately Psych:  Good insight, oriented to self Telemetry reviewed:   AFIB Medications Reviewed: (see below) Lab Data Reviewed: (see below) 
 
______________________________________________________________________ Medications:  
 
Current Facility-Administered Medications Medication Dose Route Frequency  sodium chloride (NS) flush 5-40 mL  5-40 mL IntraVENous Q8H  
 sodium chloride (NS) flush 5-40 mL  5-40 mL IntraVENous PRN  
 acetaminophen (TYLENOL) tablet 650 mg  650 mg Oral Q6H PRN Or  
 acetaminophen (TYLENOL) suppository 650 mg  650 mg Rectal Q6H PRN  polyethylene glycol (MIRALAX) packet 17 g  17 g Oral DAILY PRN  
 bisacodyL (DULCOLAX) suppository 10 mg  10 mg Rectal DAILY PRN  
 ondansetron (ZOFRAN) injection 4 mg  4 mg IntraVENous Q6H PRN  
  pantoprazole (PROTONIX) 40 mg in 0.9% sodium chloride 10 mL injection  40 mg IntraVENous Q12H  
 labetaloL (NORMODYNE;TRANDATE) 20 mg/4 mL (5 mg/mL) injection 20 mg  20 mg IntraVENous Q4H PRN  
 hydrALAZINE (APRESOLINE) 20 mg/mL injection 20 mg  20 mg IntraVENous Q4H PRN  
 melatonin (rapid dissolve) tablet 5 mg  5 mg Oral QHS PRN  
 alum-mag hydroxide-simeth (MYLANTA) oral suspension 30 mL  30 mL Oral Q4H PRN  
 LORazepam (ATIVAN) injection 0.5 mg  0.5 mg IntraVENous Q6H PRN  
 oxyCODONE IR (ROXICODONE) tablet 5 mg  5 mg Oral Q4H PRN  
 oxyCODONE IR (ROXICODONE) tablet 10 mg  10 mg Oral Q4H PRN  
 morphine injection 2 mg  2 mg IntraVENous Q4H PRN  
 diphenhydrAMINE (BENADRYL) injection 25 mg  25 mg IntraVENous Q6H PRN  
 diphenhydrAMINE (BENADRYL) capsule 25 mg  25 mg Oral Q6H PRN  
 albuterol (PROVENTIL VENTOLIN) nebulizer solution 2.5 mg  2.5 mg Nebulization Q4H PRN  
 simethicone (MYLICON) tablet 80 mg  80 mg Oral QID PRN  
 levothyroxine (SYNTHROID) tablet 50 mcg  50 mcg Oral ACB  terazosin (HYTRIN) capsule 10 mg  10 mg Oral BID  piperacillin-tazobactam (ZOSYN) 3.375 g in 0.9% sodium chloride (MBP/ADV) 100 mL MBP  3.375 g IntraVENous Q8H Current Outpatient Medications Medication Sig  
 metoprolol tartrate (LOPRESSOR) 25 mg tablet Take 25 mg by mouth two (2) times a day.  simvastatin (ZOCOR) 20 mg tablet Take 20 mg by mouth nightly.  levothyroxine (SYNTHROID) 50 mcg tablet Take 50 mcg by mouth Daily (before breakfast).  terazosin (HYTRIN) 10 mg capsule Take 10 mg by mouth two (2) times a day.  aspirin delayed-release 81 mg tablet Take 81 mg by mouth nightly.  loperamide (IMODIUM) 2 mg capsule Take 2 mg by mouth every six (6) hours as needed for Diarrhea.  escitalopram oxalate (LEXAPRO) 20 mg tablet Take 20 mg by mouth daily.   
 allopurinol (ZYLOPRIM) 100 mg tablet TAKE 1 TABLET BY MOUTH TWICE A DAY TO PREVENT GOUT  
  furosemide (LASIX) 20 mg tablet TAKE 1 TABLET BY MOUTH IN THE MORNING ON MON, WED, FRI FOR FLUID Lab Review:  
 
Recent Labs  
  02/19/21 
0441 02/18/21 
1423 WBC 18.3* 16.0*  
HGB 14.5 14.7 HCT 43.0 43.0  242 Recent Labs  
  02/19/21 
1012 02/18/21 
1423  141  
K 3.6 2.7*  
* 105 CO2 22 30 * 171* BUN 24* 22* CREA 1.05 1.28  
CA 8.0* 8.6 MG  --  1.5* ALB 2.1* 2.6* ALT 16 19 INR 1.9* 1.3* No components found for: [place]

## 2021-02-20 NOTE — PROGRESS NOTES
0700 
Bedside and Verbal shift change report given to Gianfranco Mistry (oncoming nurse) by Hay Mcleod (offgoing nurse). Report included the following information SBAR, Kardex, Procedure Summary, Intake/Output, MAR, Accordion, Recent Results and Cardiac Rhythm NSR. Initial assessment done. AOx3. No complaints of pain. Bed alarm on. Will continue to monitor. 0800 PT/ INR and urine sample sent down to the lab. 0314 [id number] COVID test came back positive. Dr. Jonnathan Darden made aware. Reordered droplet plus precautions. Visit Vitals BP (!) 145/81 (BP 1 Location: Left upper arm, BP Patient Position: At rest) Pulse 97 Temp 97.6 °F (36.4 °C) Resp 21 Wt 80.5 kg (177 lb 6 oz) SpO2 97% BMI 30.45 kg/m²  
 
1900 Bedside and Verbal shift change report given to Robert Garrison (oncoming nurse) by Jordin Deleon RN (offgoing nurse). Report included the following information SBAR, Kardex, Procedure Summary, Intake/Output, MAR, Accordion and Recent Results. Critera met for insert Yes Reason for insert: Urinary obstruction and Strict I&O Date of insert: 02/19/21 Order is current: Yes MD or RN driven: Provider Removal Discussed No  
Last CHG Bath: 02/20/21 0630 am  
Singh Care Last Completed: Date/Time: 0630 am 
Singh Care After Each Bowel Movement: Yes 
Education documented every 24 hours Yes Care Plan (Risk for UTI, Singh) Updated Every 24 hours Yes Bag below Bladder Yes Bag off Floor Yes Sheet Clip used Yes Tubing free of dependant loops Yes Seal Intact Yes Bag less than 1/2 full Yes

## 2021-02-20 NOTE — PROGRESS NOTES
General Surgery Daily Progress Note 
 
Patient: Guilherme Rodriguez MRN: [id number]  SSN: [id number]   
YOB: 1930  Age: 89 y.o.  Sex: male   
 
Admit Date: 2/18/2021 
 
POD * No surgery found * 
 
Procedure: * No surgery found * 
 
Subjective:  
Denies abdominal pain 
Wants water 
Denies N/V 
S/p placement cholecystostomy tube 2/19 
 
Current Facility-Administered Medications  
Medication Dose Route Frequency  
• amiodarone (CORDARONE) 150 mg in dextrose 5% 100 mL bolus infusion  150 mg IntraVENous ONCE  
• amiodarone (CORDARONE) 375 mg in dextrose 5% 250 mL infusion  0.5-1 mg/min IntraVENous TITRATE  
• enoxaparin (LOVENOX) injection 40 mg  40 mg SubCUTAneous Q24H  
• sodium chloride (NS) flush 5-40 mL  5-40 mL IntraVENous Q8H  
• sodium chloride (NS) flush 5-40 mL  5-40 mL IntraVENous PRN  
• acetaminophen (TYLENOL) tablet 650 mg  650 mg Oral Q6H PRN  
 Or  
• acetaminophen (TYLENOL) suppository 650 mg  650 mg Rectal Q6H PRN  
• polyethylene glycol (MIRALAX) packet 17 g  17 g Oral DAILY PRN  
• bisacodyL (DULCOLAX) suppository 10 mg  10 mg Rectal DAILY PRN  
• ondansetron (ZOFRAN) injection 4 mg  4 mg IntraVENous Q6H PRN  
• pantoprazole (PROTONIX) 40 mg in 0.9% sodium chloride 10 mL injection  40 mg IntraVENous Q12H  
• labetaloL (NORMODYNE;TRANDATE) 20 mg/4 mL (5 mg/mL) injection 20 mg  20 mg IntraVENous Q4H PRN  
• hydrALAZINE (APRESOLINE) 20 mg/mL injection 20 mg  20 mg IntraVENous Q4H PRN  
• melatonin (rapid dissolve) tablet 5 mg  5 mg Oral QHS PRN  
• alum-mag hydroxide-simeth (MYLANTA) oral suspension 30 mL  30 mL Oral Q4H PRN  
• LORazepam (ATIVAN) injection 0.5 mg  0.5 mg IntraVENous Q6H PRN  
• oxyCODONE IR (ROXICODONE) tablet 5 mg  5 mg Oral Q4H PRN  
• oxyCODONE IR (ROXICODONE) tablet 10 mg  10 mg Oral Q4H PRN  
• morphine injection 2 mg  2 mg IntraVENous Q4H PRN  
• diphenhydrAMINE (BENADRYL) injection 25 mg  25 mg IntraVENous Q6H PRN  
  diphenhydrAMINE (BENADRYL) capsule 25 mg  25 mg Oral Q6H PRN  
 albuterol (PROVENTIL VENTOLIN) nebulizer solution 2.5 mg  2.5 mg Nebulization Q4H PRN  
 simethicone (MYLICON) tablet 80 mg  80 mg Oral QID PRN  
 levothyroxine (SYNTHROID) tablet 50 mcg  50 mcg Oral ACB  piperacillin-tazobactam (ZOSYN) 3.375 g in 0.9% sodium chloride (MBP/ADV) 100 mL MBP  3.375 g IntraVENous Q8H Objective: No intake/output data recorded. 02/18 1901 - 02/20 0700 In: 4485.4 [I.V.:4485.4] Out: 36 [Urine:900; Drains:5] Patient Vitals for the past 8 hrs: 
 BP Temp Pulse Resp SpO2  
02/20/21 1238   97 28 94 % 02/20/21 0848 123/62      
02/20/21 0806 (!) 183/88 98.1 °F (36.7 °C) (!) 120 19 96 % 02/20/21 0702   (!) 124   Physical Exam: 
General: Alert, cooperative, NAD Lungs: Unlabored Abdomen: Soft, non -tender, slightly distended. CCY tube in place with some green drainage in tube and very small amount in bag Extremities: Warm, moves all, no edema Skin:  Warm and dry, no rash Labs:  
Recent Labs  
  02/20/21 
0550 WBC 16.7* HGB 13.3 HCT 39.1  Recent Labs  
  02/20/21 
0550 02/18/21 
1423 02/18/21 
1423 *   < > 141  
K 3.4*   < > 2.7*  
*   < > 105 CO2 23   < > 30 *   < > 171* BUN 25*   < > 22* CREA 1.15   < > 1.28  
CA 8.6   < > 8.6 MG  --   --  1.5* ALB 2.0*   < > 2.6* TBILI 2.4*   < > 2.1* ALT 19   < > 19  
 < > = values in this interval not displayed. Assessment / Plan: POD * No surgery found * Procedure: * No surgery found * S/P cholecystostomy tube Denies abdominal pain/N/V Continue IV abx and tube Rich Deacon, MD

## 2021-02-20 NOTE — PROGRESS NOTES
COVID PCR returned positive, rapid was negative. I had spoken to his daughter yesterday and he did recently convalesce at his SNF. As PCR is much more sensitive than Ag testing, he likely has viral particles remaining, but is not likely contagious. Regardless, will re-implement droplet plus precautions out of an abundance of caution.

## 2021-02-20 NOTE — CONSULTS
[facility]. [address] Yip 57 
[phone] GASTROENTEROLOGY CONSULTATION NOTE 
   
 
NAME:  Tayler Oneal :   1/10/1930 MRN:   [id number] Requesting Physician:    Dr. Antonia Hughes PCP: Eliceo Britt MD 
 
Consult Date: 2021 5:45 PM 
 
Chief Complaint:  No complaints History of Present Illness:  Patient is a 80 y.o. man with atrial fibrillation who is seen in consultation at the request of Dr. Antonia Hughes for evaluation and treatment of abdominal pain and reported coffee ground emesis prior to arrival. He was admitted from SNF with complaints of epigastric and RUQ pain that became progressively worse over several days to the point of nausea and emesis. He also reportedly has had waxing and waning mental status and at the time of this interview he is oriented to \"hospital\" but is not sure which one and states he is here for \"routine visit. \" He is unable to provide meaningful history. In the ER he was found to have severe sepsis and CT raised concerns for cholecystitis and surgery was consulted. He ultimately had a cholecystostomy tube placed. At this time he denies having had hematemesis/coffee ground emesis or abdominal pain. PMH: 
Past Medical History:  
Diagnosis Date  Contact dermatitis and other eczema, due to unspecified cause  Hypercholesterolemia  Hypertension PSH: 
Past Surgical History:  
Procedure Laterality Date  HX APPENDECTOMY  HX PROSTATECTOMY    
 for BPH  
 HX UROLOGICAL    
 IR CHOLECYSTOSTOMY PERCUTANEOUS SI  2021 Allergies: 
No Known Allergies Home Medications: 
Prior to Admission Medications Prescriptions Last Dose Informant Patient Reported? Taking?  
allopurinol (ZYLOPRIM) 100 mg tablet  Other No Yes Sig: TAKE 1 TABLET BY MOUTH TWICE A DAY TO PREVENT GOUT  
aspirin delayed-release 81 mg tablet  Other Yes Yes Sig: Take 81 mg by mouth nightly. escitalopram oxalate (LEXAPRO) 20 mg tablet  Other Yes Yes Sig: Take 20 mg by mouth daily. furosemide (LASIX) 20 mg tablet  Other No Yes Sig: TAKE 1 TABLET BY MOUTH IN THE MORNING ON MON, WED, FRI FOR FLUID  
levothyroxine (SYNTHROID) 50 mcg tablet  Other Yes Yes Sig: Take 50 mcg by mouth Daily (before breakfast). loperamide (IMODIUM) 2 mg capsule  Other Yes Yes Sig: Take 2 mg by mouth every six (6) hours as needed for Diarrhea.  
metoprolol tartrate (LOPRESSOR) 25 mg tablet  Other Yes Yes Sig: Take 25 mg by mouth two (2) times a day. simvastatin (ZOCOR) 20 mg tablet  Other Yes Yes Sig: Take 20 mg by mouth nightly. terazosin (HYTRIN) 10 mg capsule  Other Yes Yes Sig: Take 10 mg by mouth two (2) times a day. Facility-Administered Medications: None Hospital Medications: 
Current Facility-Administered Medications Medication Dose Route Frequency  amiodarone (CORDARONE) 375 mg in dextrose 5% 250 mL infusion  0.5-1 mg/min IntraVENous TITRATE  enoxaparin (LOVENOX) injection 40 mg  40 mg SubCUTAneous Q24H  
 sodium chloride (NS) flush 5-40 mL  5-40 mL IntraVENous Q8H  
 sodium chloride (NS) flush 5-40 mL  5-40 mL IntraVENous PRN  
 acetaminophen (TYLENOL) tablet 650 mg  650 mg Oral Q6H PRN Or  
 acetaminophen (TYLENOL) suppository 650 mg  650 mg Rectal Q6H PRN  polyethylene glycol (MIRALAX) packet 17 g  17 g Oral DAILY PRN  
 bisacodyL (DULCOLAX) suppository 10 mg  10 mg Rectal DAILY PRN  
 ondansetron (ZOFRAN) injection 4 mg  4 mg IntraVENous Q6H PRN  pantoprazole (PROTONIX) 40 mg in 0.9% sodium chloride 10 mL injection  40 mg IntraVENous Q12H  
 labetaloL (NORMODYNE;TRANDATE) 20 mg/4 mL (5 mg/mL) injection 20 mg  20 mg IntraVENous Q4H PRN  
 hydrALAZINE (APRESOLINE) 20 mg/mL injection 20 mg  20 mg IntraVENous Q4H PRN  
 melatonin (rapid dissolve) tablet 5 mg  5 mg Oral QHS PRN  
  alum-mag hydroxide-simeth (MYLANTA) oral suspension 30 mL  30 mL Oral Q4H PRN  
 LORazepam (ATIVAN) injection 0.5 mg  0.5 mg IntraVENous Q6H PRN  
 oxyCODONE IR (ROXICODONE) tablet 5 mg  5 mg Oral Q4H PRN  
 oxyCODONE IR (ROXICODONE) tablet 10 mg  10 mg Oral Q4H PRN  
 morphine injection 2 mg  2 mg IntraVENous Q4H PRN  
 diphenhydrAMINE (BENADRYL) injection 25 mg  25 mg IntraVENous Q6H PRN  
 diphenhydrAMINE (BENADRYL) capsule 25 mg  25 mg Oral Q6H PRN  
 albuterol (PROVENTIL VENTOLIN) nebulizer solution 2.5 mg  2.5 mg Nebulization Q4H PRN  
 simethicone (MYLICON) tablet 80 mg  80 mg Oral QID PRN  
 levothyroxine (SYNTHROID) tablet 50 mcg  50 mcg Oral ACB  piperacillin-tazobactam (ZOSYN) 3.375 g in 0.9% sodium chloride (MBP/ADV) 100 mL MBP  3.375 g IntraVENous Q8H Social History: 
Pt denies any history of IV drug use, blood transfusions, tattoos. Social History Tobacco Use  Smoking status: Never Smoker  Smokeless tobacco: Never Used Substance Use Topics  Alcohol use: Yes Comment: 2 OZ A DAY Family History: 
Family History Problem Relation Age of Onset  Cancer Mother Review of Systems: 
Unable to perform given AMS Objective:  
 
Patient Vitals for the past 8 hrs: 
 BP Temp Pulse Resp SpO2  
02/20/21 1526 (!) 145/81 97.6 °F (36.4 °C) 97 21 97 % 02/20/21 1435   100    
02/20/21 1304   95 21 94 % 02/20/21 1240 132/71 98.8 °F (37.1 °C) 95 22 94 % 02/20/21 1238   97 28 94 % No intake/output data recorded. 02/18 1901 - 02/20 0700 In: 4485.4 [I.V.:4485.4] Out: 36 [Urine:900; Drains:5] EXAM:   GEN- pleasant man lying askew in bed. Appears younger than age however he [facility] and oriented to \"hospital\" but not reason for visit or year. HEENT - NCAT 
 LUNGS-Clear to ausculation bilaterally. ABD-Soft , non-tender, non-distended. Bowel sounds normoactive. No                                                            hepatosplenomegaly and no masses. There is a PTC drain in RUQ with c/d/i dressing EXT-No edema, warm to touch. PSYCH - Pleasant and calm Data Review Recent Labs  
  02/20/21 
0550 02/19/21 
0441 WBC 16.7* 18.3* HGB 13.3 14.5 HCT 39.1 43.0  243 Recent Labs  
  02/20/21 
0550 02/19/21 
1012 * 142  
K 3.4* 3.6 * 112* CO2 23 22 BUN 25* 24* CREA 1.15 1.05  
* 154* CA 8.6 8.0* Recent Labs  
  02/20/21 
0550 02/19/21 
1012 02/18/21 
1423 AP 91 98 114  
TP 5.6* 5.9* 6.0* ALB 2.0* 2.1* 2.6*  
GLOB 3.6 3.8 3.4 LPSE  --   --  127 Recent Labs  
  02/20/21 
0830 02/19/21 
1012 INR 1.4* 1.9* PTP 14.5* 19.2*  
 
CT report reviewed. Concerning for gastric distention and distended GB was well as findings c/w chronic bladder outlet obstruction. RUQ ultrasound with distended GB. HIDA scan report also reviewed, abnormal concerning for cholecystitis. Problem List:  
  
Severe sepsis secondary to cholecystitis now s/p percutaneous cholecystostomy tube AMS, ? Delirium given waxing and waning Coffee ground emesis RUQ pain Atrial fibrillation with RVR Assessment/Plan:  
 
Still has leukocytosis and LFTS are normal aside from persistently elevated T bili in the 2s. Denies abdominal pain however history is unreliable due AMS. He has no evidence of upper GI bleeding here and Hgb is 13s. 
 
-No endoscopic evaluation is recommended at this time.  
-Start empiric famotidine 20 mg BID. -Continue antibiotics covering a biliary source. -Follow up surgery recs. Given absence of GI bleeding. Will sign off. Thank you Signed By: Sharif Patino MD   
 2/20/2021  5:45 PM

## 2021-02-20 NOTE — ED NOTES
Spoke with daughter and wife regarding patient status post cholecystostomy and given update. Wife states that should the patient still need to have his gall bladder removed then she will agree to consent to the surgery.

## 2021-02-20 NOTE — ED NOTES
TRANSFER - OUT REPORT: 
 
Verbal report given to Darryl Guerra RN(name) on Gayla Contreras  being transferred to [facility](unit) for routine progression of care Report consisted of patients Situation, Background, Assessment and  
Recommendations(SBAR). Information from the following report(s) SBAR, ED Summary and Procedure Summary was reviewed with the receiving nurse. Lines:  
Peripheral IV 02/18/21 Left Wrist (Active) Site Assessment Clean, dry, & intact 02/19/21 1612 Phlebitis Assessment 0 02/19/21 1612 Infiltration Assessment 0 02/19/21 1612 Dressing Status Clean, dry, & intact 02/19/21 1612 Dressing Type Transparent;Tape 02/19/21 1612 Hub Color/Line Status Blue; Infusing 02/19/21 1612 Action Taken Open ports on tubing capped 02/19/21 1612 Alcohol Cap Used Yes 02/19/21 1612 Opportunity for questions and clarification was provided. Patient transported with: 
 Monitor Registered Nurse

## 2021-02-20 NOTE — PROGRESS NOTES
TRANSFER - IN REPORT: 
 
Verbal report received from Cierra Michel RN(name) on Simonne Sever  being received from ED(unit) for routine progression of care Report consisted of patients Situation, Background, Assessment and  
Recommendations(SBAR). Information from the following report(s) SBAR, Kardex, ED Summary, Procedure Summary, Intake/Output, Recent Results, Med Rec Status and Cardiac Rhythm A-Fib was reviewed with the receiving nurse. Opportunity for questions and clarification was provided. Assessment completed upon patients arrival to unit and care assumed. 2051: Pt arrived vie stretcher accompanied by RN. Pt lehargic, confused, coperative, denies pain. Noted Rt side drain draining small amount of  dark red/brown drainage. Singh in place, draining dark roberta colored urine. Bedside and Verbal shift change report given to RN (oncoming nurse) by Celine Gamez. RM (offgoing nurse). Report included the following information SBAR, Kardex, ED Summary, Procedure Summary, Intake/Output, Recent Results, Med Rec Status and Cardiac Rhythm A-Fib.

## 2021-02-20 NOTE — ED NOTES
GI consult called by . Dr. Aide Chen returned paged and given update on patient. Dr. Loomis will see tomorrow. Noted that PPI was ordered and that patient was stable and could be seen tomorrow.

## 2021-02-20 NOTE — PROGRESS NOTES
Josiah [facility] 
[address] [facility], [place], [address] [place] [address] 
[phone] Medical Progress Note NAME: James Jacobo :  1/10/1930 MRM:  [id number] Date/Time: 2021 7:59 AM 
 
 
  
Assessment / Plan: #Sepsis: Most likely source is gallbladder; however, also with mildly abnl UA. Pressures acceptable at present. In [facility]  2/2 sepsis. - Appreciate Surg recs 
  -Per pérez  
   - HIDA w/ bile duct obstruction, may need ERCP 
   - GI to eval today, appreciate recs - Pip/tazo  -  
  - Hopeful this can be managed without surgery - F/u UCx, BCx, bile cx 
 - Continue to assess fluid responsiveness; UOP and MAPs to goal 
 
#AFwRVR: No known hx. INR elevated, but this is most likely e/o coagulopathy in s/o above as pt does not take warfarin. Presently hemodynamically stable if not hypertensive - Treat sepsis; treat volume - Trial prn IV metop for now followed by amio gtt vs dilt gtt based on BP; ultimately driven by sepsis though #HAMLET: Likely pre-renal in s/o above. - FENa today as UOP decreasing #Urinary Retention: > 500cc pvr. Known BPH. Potential source of infection. Doubt obstructive hamlet 
 - Singh - f/u ucx #Hypokalemia: Repleted #Elevated INR: Favor DIC/Coagulopathy in s/o above. No utility in reversing #Hypothyroidism: 
 - Cont home LT4 Total time spent with patient: 30 Minutes Care Plan discussed with: Patient and Family Discussed:  Code Status and Care Plan Prophylaxis:  Lovenox Disposition:  Home w/Family 
        
___________________________________________________ Attending Physician: Humphrey Champion MD  
 
  
Subjective: Chief Complaint:  Less responsive today, but denies pain, feels ok ROS: 
(bold if positive, if negative) Objective:  
 
 
Vitals:  
 
 
  
Last 24hrs VS reviewed since prior progress note. Most recent are: 
 
Visit Vitals BP (!) 148/91 (BP 1 Location: Left upper arm, BP Patient Position: At rest;Lying left side) Pulse (!) 124 Temp 97.7 °F (36.5 °C) Resp 18 Wt 80.5 kg (177 lb 6 oz) SpO2 95% BMI 30.45 kg/m² SpO2 Readings from Last 6 Encounters:  
02/20/21 95% 01/16/14 97% 11/14/13 96% 10/18/13 97% 10/17/13 95% 10/14/13 99% Intake/Output Summary (Last 24 hours) at 2/20/2021 4385 Last data filed at 2/20/2021 3289 Gross per 24 hour Intake 3785.42 ml Output 905 ml Net 2880.42 ml Exam:  
 
Physical Exam: 
 
Gen:  Well-developed, well-nourished, in no acute distress HEENT:  Dry MM Neck:  Supple, without masses, thyroid non-tender Resp:  No accessory muscle use, clear breath sounds without wheezes rales or rhonchi 
Card:  No murmurs, normal S1, S2 without thrills, bruits or peripheral edema Abd:  Soft, mild TTP RUQ;, non-distended, normoactive bowel sounds are present Musc:  No cyanosis or clubbing Skin:  No rashes or ulcers, skin turgor is good Neuro:  Cranial nerves 3-12 are grossly intact,  strength is 5/5 bilaterally and dorsi / plantarflexion is 5/5 bilaterally, follows commands appropriately Psych:  Good insight, oriented to self Telemetry reviewed:   AFIB Medications Reviewed: (see below) Lab Data Reviewed: (see below) 
 
______________________________________________________________________ Medications:  
 
Current Facility-Administered Medications Medication Dose Route Frequency  amiodarone (CORDARONE) 150 mg in dextrose 5% 100 mL bolus infusion  150 mg IntraVENous ONCE  
 amiodarone (CORDARONE) 375 mg in dextrose 5% 250 mL infusion  0.5-1 mg/min IntraVENous TITRATE  metoprolol (LOPRESSOR) injection 5 mg  5 mg IntraVENous ONCE  
 enoxaparin (LOVENOX) injection 40 mg  40 mg SubCUTAneous Q24H  
 sodium chloride (NS) flush 5-40 mL  5-40 mL IntraVENous Q8H  
 sodium chloride (NS) flush 5-40 mL  5-40 mL IntraVENous PRN  
  acetaminophen (TYLENOL) tablet 650 mg  650 mg Oral Q6H PRN Or  
 acetaminophen (TYLENOL) suppository 650 mg  650 mg Rectal Q6H PRN  polyethylene glycol (MIRALAX) packet 17 g  17 g Oral DAILY PRN  
 bisacodyL (DULCOLAX) suppository 10 mg  10 mg Rectal DAILY PRN  
 ondansetron (ZOFRAN) injection 4 mg  4 mg IntraVENous Q6H PRN  pantoprazole (PROTONIX) 40 mg in 0.9% sodium chloride 10 mL injection  40 mg IntraVENous Q12H  
 labetaloL (NORMODYNE;TRANDATE) 20 mg/4 mL (5 mg/mL) injection 20 mg  20 mg IntraVENous Q4H PRN  
 hydrALAZINE (APRESOLINE) 20 mg/mL injection 20 mg  20 mg IntraVENous Q4H PRN  
 melatonin (rapid dissolve) tablet 5 mg  5 mg Oral QHS PRN  
 alum-mag hydroxide-simeth (MYLANTA) oral suspension 30 mL  30 mL Oral Q4H PRN  
 LORazepam (ATIVAN) injection 0.5 mg  0.5 mg IntraVENous Q6H PRN  
 oxyCODONE IR (ROXICODONE) tablet 5 mg  5 mg Oral Q4H PRN  
 oxyCODONE IR (ROXICODONE) tablet 10 mg  10 mg Oral Q4H PRN  
 morphine injection 2 mg  2 mg IntraVENous Q4H PRN  
 diphenhydrAMINE (BENADRYL) injection 25 mg  25 mg IntraVENous Q6H PRN  
 diphenhydrAMINE (BENADRYL) capsule 25 mg  25 mg Oral Q6H PRN  
 albuterol (PROVENTIL VENTOLIN) nebulizer solution 2.5 mg  2.5 mg Nebulization Q4H PRN  
 simethicone (MYLICON) tablet 80 mg  80 mg Oral QID PRN  
 levothyroxine (SYNTHROID) tablet 50 mcg  50 mcg Oral ACB  piperacillin-tazobactam (ZOSYN) 3.375 g in 0.9% sodium chloride (MBP/ADV) 100 mL MBP  3.375 g IntraVENous Q8H Lab Review:  
 
Recent Labs  
  02/20/21 
0550 02/19/21 
0441 02/18/21 
1423 WBC 16.7* 18.3* 16.0*  
HGB 13.3 14.5 14.7 HCT 39.1 43.0 43.0  243 242 Recent Labs  
  02/20/21 
0550 02/19/21 
1012 02/18/21 
1423 * 142 141  
K 3.4* 3.6 2.7*  
* 112* 105 CO2 23 22 30 * 154* 171* BUN 25* 24* 22* CREA 1.15 1.05 1.28  
CA 8.6 8.0* 8.6 MG  --   --  1.5* ALB 2.0* 2.1* 2.6* ALT 19 16 19 INR  --  1.9* 1.3*  
 
 No components found for: [place]

## 2021-02-21 NOTE — PROGRESS NOTES
Shift Change:  
  
Bedside and Verbal shift change report given to [address]abeel Hoffman (oncoming nurse) by [facility] (offgoing nurse). Report included the following information SBAR, Kardex and Recent Results. Shift Summary: 
 
0920: EKG complete, patient in Afib, EKG results placed in chart Shift Change:  
  
Bedside and Verbal shift change report given to [address] [place] [address] (oncoming nurse) by [address] Hoffman (offgoing nurse). Report included the following information SBAR, Kardex and Recent Results.

## 2021-02-21 NOTE — PROGRESS NOTES
Shift change Bedside and Verbal shift change report given to Orlin Martin RN(oncoming nurse) by Desmond Friend RN (offgoing nurse). Report included the following information SBAR, Kardex and Recent Results. Shift Summary Disaster charting started Shift Change Bedside and verbal shift change report given to MALKA Varghese (oncoming nurse) by Orlin Martin RN (offgoing nurse). Report included the following information SBAR, Kardex and Recent Results.

## 2021-02-21 NOTE — PROGRESS NOTES
[facility] 79 
[address] [facility], [place], [address] [place] [address] 
[phone] Medical Progress Note NAME: Jocelyne Dover :  1/10/1930 MRM:  [id number] Date/Time: 2021 8:00 AM 
 
 
  
Assessment / Plan: #Sepsis: Most likely source is gallbladder; however, also with mildly abnl UA. Pressures acceptable at present. In [facility]  2 sepsis. Leukocytosis persists suggesting inadequate abx or source control - Appreciate Surg recs 
  -Per pérez  
  - Pip/tazo  
  - Change to cipro/flagyl given continued leukocytosis and ultimately plan to transition to PO regimen - F/u UCx, BCx, bile cx 
 - Continue to assess fluid responsiveness; UOP and MAPs to goal 
 
#AFwRVR: Further hx review suggests pAF. INR elevated, but this is most likely e/o coagulopathy in s/o above as pt does not take warfarin. Presently hemodynamically stable if not hypertensive - Treat sepsis; treat volume - Amio gtt to complete today; will consider whether or not to transition to PO based on resolution of the above and response to home metop - Metop 25mg bid as outpt; restart today #HAMLET: FENa pre-renal. No hx CHF, does not appear volume overloaded; doubt cardiorenal.  
 - Gentle fluids today #Urinary Retention: > 500cc pvr. Known BPH. Potential source of infection. Doubt obstructive hamlet 
 - Singh - f/u ucx #SARS-CoV-2: COVID PCR returned positive, rapid was negative. I had spoken to his daughter on admission and he did recently convalesce at his SNF. As PCR is much more sensitive than Ag testing, he likely has viral particles remaining, but is not likely contagious. Regardless, implement droplet plus precautions out of an abundance of caution. #Hypokalemia: Repleted #Elevated INR: Favor DIC/Coagulopathy in s/o above. No utility in reversing #Hypothyroidism: 
 - Cont home LT4 Total time spent with patient: 30 Minutes Care Plan discussed with: Patient and Family 
 
Discussed:  Code Status and Care Plan 
 
Prophylaxis:  Lovenox 
 
Disposition:  Home w/Family 
        
___________________________________________________ 
 
Attending Physician: Mikey Macario MD  
 
  
Subjective:  
 
Chief Complaint:  Much clearer today. Oriented x 3 and pleasant. Says he feels great without complaints.  
 
ROS: 
(bold if positive, if negative) 
 
 
  
 
  
Objective:  
 
 
Vitals:  
 
 
  
Last 24hrs VS reviewed since prior progress note. Most recent are: 
 
Visit Vitals 
/82 (BP 1 Location: Right upper arm, BP Patient Position: At rest)  
Pulse (!) 102  
Temp 97.8 °F (36.6 °C)  
Resp 24  
Wt 81.6 kg (180 lb)  
SpO2 95%  
BMI 30.90 kg/m²  
 
SpO2 Readings from Last 6 Encounters:  
02/21/21 95%  
01/16/14 97%  
11/14/13 96%  
10/18/13 97%  
10/17/13 95%  
10/14/13 99%  
    
 
 
Intake/Output Summary (Last 24 hours) at 2/21/2021 0759 
Last data filed at 2/21/2021 0659 
Gross per 24 hour  
Intake 180 ml  
Output 405 ml  
Net -225 ml  
  
 
 
Exam:  
 
Physical Exam: 
 
Gen:  Well-developed, well-nourished, in no acute distress 
HEENT:  MMM 
Neck:  Supple, without masses, thyroid non-tender 
Resp:  No accessory muscle use, clear breath sounds without wheezes rales or rhonchi 
Card:  No murmurs, normal S1, S2 without thrills, bruits or peripheral edema 
Abd:  Soft, Non tender, no rebound or guarding 
Musc:  No cyanosis or clubbing 
Skin:  No rashes or ulcers, skin turgor is good 
Neuro:  Cranial nerves 3-12 are grossly intact,  strength is 5/5 bilaterally and dorsi / plantarflexion is 5/5 bilaterally, follows commands appropriately 
Psych:  Good insight, oriented x 3 and context 
  
Telemetry reviewed:   AFIB 
 
Medications Reviewed: (see below) 
 
Lab Data Reviewed: (see below) 
 
______________________________________________________________________ 
 
Medications:  
 
Current Facility-Administered Medications  
 Medication Dose Route Frequency  lactated ringers bolus infusion 1,000 mL  1,000 mL IntraVENous ONCE  
 metroNIDAZOLE (FLAGYL) tablet 500 mg  500 mg Oral TID  ciprofloxacin (CIPRO) 400 mg in D5W IVPB (premix)  400 mg IntraVENous Q12H  
 amiodarone (CORDARONE) 375 mg in dextrose 5% 250 mL infusion  0.5-1 mg/min IntraVENous TITRATE  enoxaparin (LOVENOX) injection 40 mg  40 mg SubCUTAneous Q24H  
 sodium chloride (NS) flush 5-40 mL  5-40 mL IntraVENous Q8H  
 sodium chloride (NS) flush 5-40 mL  5-40 mL IntraVENous PRN  
 acetaminophen (TYLENOL) tablet 650 mg  650 mg Oral Q6H PRN Or  
 acetaminophen (TYLENOL) suppository 650 mg  650 mg Rectal Q6H PRN  polyethylene glycol (MIRALAX) packet 17 g  17 g Oral DAILY PRN  
 bisacodyL (DULCOLAX) suppository 10 mg  10 mg Rectal DAILY PRN  
 ondansetron (ZOFRAN) injection 4 mg  4 mg IntraVENous Q6H PRN  pantoprazole (PROTONIX) 40 mg in 0.9% sodium chloride 10 mL injection  40 mg IntraVENous Q12H  
 labetaloL (NORMODYNE;TRANDATE) 20 mg/4 mL (5 mg/mL) injection 20 mg  20 mg IntraVENous Q4H PRN  
 hydrALAZINE (APRESOLINE) 20 mg/mL injection 20 mg  20 mg IntraVENous Q4H PRN  
 melatonin (rapid dissolve) tablet 5 mg  5 mg Oral QHS PRN  
 alum-mag hydroxide-simeth (MYLANTA) oral suspension 30 mL  30 mL Oral Q4H PRN  
 LORazepam (ATIVAN) injection 0.5 mg  0.5 mg IntraVENous Q6H PRN  
 oxyCODONE IR (ROXICODONE) tablet 5 mg  5 mg Oral Q4H PRN  
 oxyCODONE IR (ROXICODONE) tablet 10 mg  10 mg Oral Q4H PRN  
 morphine injection 2 mg  2 mg IntraVENous Q4H PRN  
 diphenhydrAMINE (BENADRYL) injection 25 mg  25 mg IntraVENous Q6H PRN  
 diphenhydrAMINE (BENADRYL) capsule 25 mg  25 mg Oral Q6H PRN  
 albuterol (PROVENTIL VENTOLIN) nebulizer solution 2.5 mg  2.5 mg Nebulization Q4H PRN  
 simethicone (MYLICON) tablet 80 mg  80 mg Oral QID PRN  
 levothyroxine (SYNTHROID) tablet 50 mcg  50 mcg Oral ACB Lab Review:  
 
Recent Labs  
  02/21/21 0600 02/20/21 
0550 02/19/21 
0441 WBC 15.2* 16.7* 18.3* HGB 12.6 13.3 14.5 HCT 37.1 39.1 43.0  233 243 Recent Labs  
  02/21/21 
0500 02/20/21 
0830 02/20/21 
0550 02/19/21 
1012 02/18/21 
1423   --  146* 142 141  
K 3.5  --  3.4* 3.6 2.7*  
*  --  114* 112* 105 CO2 18*  --  23 22 30 *  --  131* 154* 171* BUN 27*  --  25* 24* 22* CREA 1.36*  --  1.15 1.05 1.28  
CA 8.0*  --  8.6 8.0* 8.6 MG  --   --   --   --  1.5* ALB 1.8*  --  2.0* 2.1* 2.6* ALT 23  --  19 16 19 INR  --  1.4*  --  1.9* 1.3* No components found for: [place]

## 2021-02-21 NOTE — PROGRESS NOTES
[address] Dosing Services: Antimicrobial Stewardship Progress Note Ciprofloxacin - changed to Levofloxacin and renally adjusted per P&T protocol. Non-Kinetic Antimicrobial Dosing:  
Assessment/Plan: Ciprofloxacin changed to Levofloxacin per P&T approved protocol. Est CrCl ~40 mL/min. Begin Levofloxacin 750 mg IV every 48 hours per P&T approved protocol for patient with CrCl less than 50 mL/min. Serum Creatinine Lab Results Component Value Date/Time Creatinine 1.36 (H) 02/21/2021 05:00 AM  
 Creatinine (POC) 1.3 10/14/2013 07:07 PM  
   
Creatinine Clearance CrCl cannot be calculated (Unknown ideal weight.). Procalcitonin  No results found for: PCT Temp  
97.8 °F (36.6 °C) WBC Lab Results Component Value Date/Time WBC 15.2 (H) 02/21/2021 06:00 AM  
   
H/H Lab Results Component Value Date/Time HGB 12.6 02/21/2021 06:00 AM  
  
Platelets Lab Results Component Value Date/Time  PLATELET 620 66/50/5560 06:00 AM  
 
  
 
Rhonda Valverde, Pharmacist

## 2021-02-21 NOTE — ACP (ADVANCE CARE PLANNING)
[address] Adult  Hospitalist Group Advance Care Planning Note Name: Royce Morales YOB: 1930 MRN: [id number] Admission Date: 2/18/2021  1:50 PM 
 
Date of discussion: 2/21/2021 Active Diagnoses: 
 
Hospital Problems  Date Reviewed: 10/10/2013 Codes Class Noted POA Atrial fibrillation [facility]) ICD-10-CM: I48.91 
ICD-9-CM: 427.31  2/18/2021 Unknown Acute cholecystitis ICD-10-CM: K81.0 ICD-9-CM: 575.0  2/18/2021 Unknown Hypokalemia ICD-10-CM: E87.6 ICD-9-CM: 276.8  2/18/2021 Unknown Lactic acidosis ICD-10-CM: E87.2 ICD-9-CM: 276.2  2/18/2021 Unknown Hyperbilirubinemia ICD-10-CM: E80.6 ICD-9-CM: 782.4  2/18/2021 Unknown Leukocytosis ICD-10-CM: D92.249 ICD-9-CM: 288.60  2/18/2021 Unknown GIB (gastrointestinal bleeding) ICD-10-CM: K92.2 ICD-9-CM: 578.9  2/18/2021 Unknown RUQ pain ICD-10-CM: R10.11 ICD-9-CM: 789.01  2/18/2021 Unknown Severe sepsis (HCC) ICD-10-CM: A41.9, R65.20 ICD-9-CM: 038.9, 995.92  2/18/2021 Unknown Acute epigastric pain ICD-10-CM: R10.13 ICD-9-CM: 789.06, 338.19  2/18/2021 Unknown Abdominal pain, acute, right upper quadrant ICD-10-CM: R10.11 ICD-9-CM: 789.01, 338.19  2/18/2021 Unknown Acute metabolic encephalopathy UKS-21-KA: G93.41 
ICD-9-CM: 348.31  2/18/2021 Unknown HTN (hypertension) ICD-10-CM: I10 
ICD-9-CM: 401.9  5/10/2010 Yes Overview Addendum 3/20/2013  2:09 PM by Rui Lin  
  ECHO 2008 EF 55% ECHO 46359: The ventricle was mildly dilated. Systolic function was 
normal by visual assessment. Ejection fraction was estimated to be 60 %. There were no regional wall motion abnormalities. There was mild 
asymmetric hypertrophy involving focal basal segments. Lexiscan normal 2012 These active diagnoses are of sufficient risk that focused discussion on advance care planning is indicated in order to allow the patient to thoughtfully consider personal goals of care, and if situations arise that prevent the ability to personally give input, to ensure appropriate representation of their personal desires for different levels and aggressiveness of care. Discussion:  
 
Persons present and participating in discussion: Delicia Michele MD, Berolle, daughter Topics Discussed: 
Patient's medical condition and diagnosis: [x  ] yes [  ] no  
Surrogate decision maker: [x  ] yes [  ] no Patient's current physical function/cognitive function/frailty: [ x ] yes [  ] no Code Status: [ x ] yes [  ] no  
Artificial Nutrition / Dialysis / Non-Invasive Ventilation / Blood Transfusion: [ x ] yes [  ] no Potential Resources for home (durable medical equipment, home nursing, home O2): [x  ] yes [  ] no Overview of Discussion: Discussed that patient likely does not wish to have resuscitative measures, but does want curative treatment at this time. Daughter and wife would be decision makers. Thinks wife has paperwork, but they can't find it. Daughter and wife will look into this Time Spent:  
 
Total time spent face-to-face in education and discussion: 16 minutes.   
 
Jeniffer Campos MD 
Date of Service:  2/21/2021 
2:18 PM

## 2021-02-21 NOTE — PROGRESS NOTES
General Surgery Daily Progress Note Patient: Mariam Mercado MRN: [id number]  SSN: [id number] YOB: 1930  Age: 80 y.o. Sex: male Admit Date: 2/18/2021 POD * No surgery found * Procedure: * No surgery found * Subjective:  
Denies any N/V Denies any abdominal pain Current Facility-Administered Medications Medication Dose Route Frequency  metroNIDAZOLE (FLAGYL) tablet 500 mg  500 mg Oral TID  metoprolol tartrate (LOPRESSOR) tablet 25 mg  25 mg Oral BID  levoFLOXacin (LEVAQUIN) 750 mg in D5W IVPB  750 mg IntraVENous Q48H  
 amiodarone (CORDARONE) 375 mg in dextrose 5% 250 mL infusion  0.5-1 mg/min IntraVENous TITRATE  enoxaparin (LOVENOX) injection 40 mg  40 mg SubCUTAneous Q24H  
 sodium chloride (NS) flush 5-40 mL  5-40 mL IntraVENous Q8H  
 sodium chloride (NS) flush 5-40 mL  5-40 mL IntraVENous PRN  
 acetaminophen (TYLENOL) tablet 650 mg  650 mg Oral Q6H PRN Or  
 acetaminophen (TYLENOL) suppository 650 mg  650 mg Rectal Q6H PRN  polyethylene glycol (MIRALAX) packet 17 g  17 g Oral DAILY PRN  
 bisacodyL (DULCOLAX) suppository 10 mg  10 mg Rectal DAILY PRN  
 ondansetron (ZOFRAN) injection 4 mg  4 mg IntraVENous Q6H PRN  pantoprazole (PROTONIX) 40 mg in 0.9% sodium chloride 10 mL injection  40 mg IntraVENous Q12H  
 labetaloL (NORMODYNE;TRANDATE) 20 mg/4 mL (5 mg/mL) injection 20 mg  20 mg IntraVENous Q4H PRN  
 hydrALAZINE (APRESOLINE) 20 mg/mL injection 20 mg  20 mg IntraVENous Q4H PRN  
 melatonin (rapid dissolve) tablet 5 mg  5 mg Oral QHS PRN  
 alum-mag hydroxide-simeth (MYLANTA) oral suspension 30 mL  30 mL Oral Q4H PRN  
 LORazepam (ATIVAN) injection 0.5 mg  0.5 mg IntraVENous Q6H PRN  
 oxyCODONE IR (ROXICODONE) tablet 5 mg  5 mg Oral Q4H PRN  
 oxyCODONE IR (ROXICODONE) tablet 10 mg  10 mg Oral Q4H PRN  
 morphine injection 2 mg  2 mg IntraVENous Q4H PRN  
  diphenhydrAMINE (BENADRYL) injection 25 mg  25 mg IntraVENous Q6H PRN  
 diphenhydrAMINE (BENADRYL) capsule 25 mg  25 mg Oral Q6H PRN  
 albuterol (PROVENTIL VENTOLIN) nebulizer solution 2.5 mg  2.5 mg Nebulization Q4H PRN  
 simethicone (MYLICON) tablet 80 mg  80 mg Oral QID PRN  
 levothyroxine (SYNTHROID) tablet 50 mcg  50 mcg Oral ACB Objective: No intake/output data recorded. 02/19 1901 - 02/21 0700 In: 380 [P.O.:180; I.V.:200] Out: 810 [Urine:725; Drains:85] Patient Vitals for the past 8 hrs: 
 BP Temp Pulse Resp SpO2 Weight  
02/21/21 0738 125/82 97.8 °F (36.6 °C) (!) 102 24 95 %   
02/21/21 0700   (!) 105     
02/21/21 0347 108/75 97.5 °F (36.4 °C) (!) 104 28 94 % 81.6 kg (180 lb) Physical Exam: 
General: Alert, cooperative, NAD Lungs: Unlabored Abdomen: Soft, non-tender, minimally distended. Drain in place with bilious fluid Extremities: Warm, moves all, no edema Skin:  Warm and dry, no rash Labs:  
Recent Labs  
  02/21/21 
0600 WBC 15.2* HGB 12.6 HCT 37.1  Recent Labs  
  02/21/21 
0500 02/18/21 
1423 02/18/21 
1423    < > 141  
K 3.5   < > 2.7*  
*   < > 105 CO2 18*   < > 30 *   < > 171* BUN 27*   < > 22* CREA 1.36*   < > 1.28  
CA 8.0*   < > 8.6 MG  --   --  1.5* ALB 1.8*   < > 2.6* TBILI 2.0*   < > 2.1* ALT 23   < > 19  
 < > = values in this interval not displayed. Assessment / Plan: POD * No surgery found * Procedure: * No surgery found * Cholecystostomy tube in place, TBil decreasing Clears and advance as tolerated Make sure sitting up when taking in clears and food No surgical intervention at this time CCY tube needs to stay to bag 
 
 
 
Cortes Santamaria MD

## 2021-02-22 NOTE — PROGRESS NOTES
Josiah [facility] 
[address] [facility], [place], [address] [place] [address] 
[phone] Medical Progress Note NAME: Tamara Rowe :  1/10/1930 MRM:  [id number] Date/Time: 2021 2:40 PM 
 
 
  
Assessment / Plan: #Sepsis: Resolved. Most likely source is gallbladder; however, also with mildly abnl UA. WBC has improved as has mentation.  
 - Appreciate Surg recs - Per pérez  
  - Pip/tazo  -  
  - Changed to IV levo/flagyl  To PO today - F/u UCx, BCx, bile cx #Cholecystitis: Now s/p per pérez. Surgery following, plans to discuss with family about inpt lap pérez vs non-surg tx with abx and perc pérez #AFwRVR: Further hx review suggests pAF. INR elevated on arrival, but this was most likely e/o coagulopathy in s/o above as pt does not take warfarin. Required amio gtt initially, but HR has now improved with treatment of sepsis  
 - d/c amio - Metop 25mg bid as outpt, continued #HAMLET: FENa pre-renal. No hx CHF, does not appear volume overloaded; doubt cardiorenal.  
 - Gentle fluids again today #Urinary Retention: > 500cc pvr. Known BPH. Potential source of infection. Doubt obstructive hamlet 
 - Singh - f/u ucx #SARS-CoV-2: COVID PCR returned positive, rapid was negative. I had spoken to his daughter on admission and he did recently convalesce at his SNF. As PCR is much more sensitive than Ag testing, he likely has viral particles remaining, but is not likely contagious. Regardless, implement droplet plus precautions out of an abundance of caution. #Hypokalemia: Repleted #Elevated INR: Favor DIC/Coagulopathy in s/o above. No utility in reversing #Hypothyroidism: 
 - Cont home LT4 Total time spent with patient: 30 Minutes Care Plan discussed with: Patient and Family Discussed:  Code Status and Care Plan Prophylaxis:  Lovenox Disposition:  Home w/Family ___________________________________________________ Attending Physician: Irasema Moe MD  
 
  
Subjective: Chief Complaint:  He has no complaints and wants to go home ROS: 
(bold if positive, if negative) Objective:  
 
 
Vitals:  
 
 
  
Last 24hrs VS reviewed since prior progress note. Most recent are: 
 
Visit Vitals /68 (BP 1 Location: Right upper arm, BP Patient Position: At rest) Pulse 85 Temp 97.5 °F (36.4 °C) Resp 24 Wt 81.6 kg (180 lb) SpO2 92% BMI 30.90 kg/m² SpO2 Readings from Last 6 Encounters:  
02/22/21 92% 01/16/14 97% 11/14/13 96% 10/18/13 97% 10/17/13 95% 10/14/13 99% Intake/Output Summary (Last 24 hours) at 2/22/2021 1440 Last data filed at 2/22/2021 0630 Gross per 24 hour Intake  Output 164 ml Net -164 ml Exam:  
 
Physical Exam: 
 
Gen:  Well-developed, well-nourished, in no acute distress HEENT:  MMM Neck:  Supple, without masses, thyroid non-tender Resp:  No accessory muscle use, clear breath sounds without wheezes rales or rhonchi 
Card:  No murmurs, normal S1, S2 without thrills, bruits or peripheral edema Abd:  Soft, Non tender, no rebound or guarding Musc:  No cyanosis or clubbing Skin:  No rashes or ulcers, skin turgor is good Neuro:  Cranial nerves 3-12 are grossly intact,  strength is 5/5 bilaterally and dorsi / plantarflexion is 5/5 bilaterally, follows commands appropriately Psych:  Good insight, oriented x 3 and context Telemetry reviewed:   AFIB Medications Reviewed: (see below) Lab Data Reviewed: (see below) 
 
______________________________________________________________________ Medications:  
 
Current Facility-Administered Medications Medication Dose Route Frequency  metroNIDAZOLE (FLAGYL) tablet 500 mg  500 mg Oral TID  levoFLOXacin (LEVAQUIN) 750 mg in D5W IVPB  750 mg IntraVENous Q48H  
 metoprolol tartrate (LOPRESSOR) tablet 50 mg  50 mg Oral BID  
  enoxaparin (LOVENOX) injection 40 mg  40 mg SubCUTAneous Q24H  
 sodium chloride (NS) flush 5-40 mL  5-40 mL IntraVENous Q8H  
 sodium chloride (NS) flush 5-40 mL  5-40 mL IntraVENous PRN  
 acetaminophen (TYLENOL) tablet 650 mg  650 mg Oral Q6H PRN Or  
 acetaminophen (TYLENOL) suppository 650 mg  650 mg Rectal Q6H PRN  polyethylene glycol (MIRALAX) packet 17 g  17 g Oral DAILY PRN  
 bisacodyL (DULCOLAX) suppository 10 mg  10 mg Rectal DAILY PRN  
 ondansetron (ZOFRAN) injection 4 mg  4 mg IntraVENous Q6H PRN  pantoprazole (PROTONIX) 40 mg in 0.9% sodium chloride 10 mL injection  40 mg IntraVENous Q12H  
 labetaloL (NORMODYNE;TRANDATE) 20 mg/4 mL (5 mg/mL) injection 20 mg  20 mg IntraVENous Q4H PRN  
 hydrALAZINE (APRESOLINE) 20 mg/mL injection 20 mg  20 mg IntraVENous Q4H PRN  
 melatonin (rapid dissolve) tablet 5 mg  5 mg Oral QHS PRN  
 alum-mag hydroxide-simeth (MYLANTA) oral suspension 30 mL  30 mL Oral Q4H PRN  
 LORazepam (ATIVAN) injection 0.5 mg  0.5 mg IntraVENous Q6H PRN  
 oxyCODONE IR (ROXICODONE) tablet 5 mg  5 mg Oral Q4H PRN  
 oxyCODONE IR (ROXICODONE) tablet 10 mg  10 mg Oral Q4H PRN  
 morphine injection 2 mg  2 mg IntraVENous Q4H PRN  
 diphenhydrAMINE (BENADRYL) injection 25 mg  25 mg IntraVENous Q6H PRN  
 diphenhydrAMINE (BENADRYL) capsule 25 mg  25 mg Oral Q6H PRN  
 albuterol (PROVENTIL VENTOLIN) nebulizer solution 2.5 mg  2.5 mg Nebulization Q4H PRN  
 simethicone (MYLICON) tablet 80 mg  80 mg Oral QID PRN  
 levothyroxine (SYNTHROID) tablet 50 mcg  50 mcg Oral ACB Lab Review:  
 
Recent Labs  
  02/22/21 
0452 02/21/21 
0600 02/20/21 
0550 WBC 9.6 15.2* 16.7* HGB 11.9* 12.6 13.3 HCT 34.4* 37.1 39.1  255 233 Recent Labs  
  02/22/21 
0452 02/21/21 
0500 02/20/21 
0830 02/20/21 
0550  144  --  146*  
K 3.2* 3.5  --  3.4*  
* 115*  --  114* CO2 24 18*  --  23  
GLU 70 111*  --  131* BUN 29* 27*  --  25* CREA 1.51* 1.36*  --  1.15  
CA 8.1* 8.0*  --  8.6  
ALB 1.8* 1.8*  --  2.0*  
ALT 29 23  --  19  
INR  --   --  1.4*  --   
 
No components found for: GLPOC

## 2021-02-22 NOTE — PROGRESS NOTES
Shift Change:  
  
Bedside and Verbal shift change report given to [address] Hoffman (oncoming nurse) by Yohan Fernandez RN (offgoing nurse). Report included the following information SBAR, Kardex and Recent Results. Shift Change:  
  
Bedside and Verbal shift change report given to Zakia Stallworth (oncoming nurse) by [address] Hoffman (offgoing nurse). Report included the following information SBAR, Kardex and Recent Results.

## 2021-02-22 NOTE — PROGRESS NOTES
Problem: Mobility Impaired (Adult and Pediatric) Goal: *Acute Goals and Plan of Care (Insert Text) Description: FUNCTIONAL STATUS PRIOR TO ADMISSION: The patient was functional at the wheelchair level and required minimal assistance for transfers to the chair. HOME SUPPORT PRIOR TO ADMISSION: The patient lived with spouse. Physical Therapy Goals Initiated 2/22/2021 1. Patient will move from supine to sit and sit to supine , scoot up and down, and roll side to side in bed with modified independence within 7 day(s). 2.  Patient will transfer from bed to wheelchair/chair and wheelchair/chair to bed with modified independence using the least restrictive device or a sliding board within 7 day(s). Outcome: Progressing Towards Goal 
 PHYSICAL THERAPY EVALUATION Patient: Shreya Esquivel (80 y.o. male) Date: 2/22/2021 Primary Diagnosis: Severe sepsis ([facility] [address] [A41.9, R65.20] Lactic acidosis [E87.2] Acute cholecystitis [K81.0] RUQ pain [R10.11] Acute epigastric pain [R10.13] Abdominal pain, acute, right upper quadrant [R10.11] Hyperbilirubinemia [E80.6] Hypokalemia [E87.6] Leukocytosis [D72.829] Atrial fibrillation ([address] [I48.91] GIB (gastrointestinal bleeding) [K92.2] Acute metabolic encephalopathy [L35.35] Precautions: covid precautions wheelchair bound use sliding board ASSESSMENT Based on the objective data described below, the patient presents with difficulty with transfers to wheelchair. Communicated with nurse who was in the room with the patient cleared for therapy. Patient lives with spouse wheelchair bound from history of polio uses sliding board to transfers to and from the wheelchair at home. Rolled on the edge of bed total assist, supine to sit total assist sitting balance poor need constant support attempted to transfer to recliner with sliding board total assist patient declined stated he is afraid he will not be able to slide back to bed later. Requested to do it tomorrow. Communicated with nurse azael kimble to monitor patient. Current Level of Function Impacting Discharge (mobility/balance): total assist with bed mobility using sliding board. Functional Outcome Measure: The patient scored 5/100 on the barthel index outcome measure Other factors to consider for discharge: fall Patient will benefit from skilled therapy intervention to address the above noted impairments. PLAN : 
Recommendations and Planned Interventions: bed mobility training, transfer training, therapeutic exercises, patient and family training/education, and therapeutic activities Frequency/Duration: Patient will be followed by physical therapy:  3 times a week to address goals. Recommendation for discharge: (in order for the patient to meet his/her long term goals) Physical therapy at least 2 days/week in the home This discharge recommendation: 
Has been made in collaboration with the attending provider and/or case management IF patient discharges home will need the following DME: patient owns DME required for discharge SUBJECTIVE:  
Patient stated ok.  OBJECTIVE DATA SUMMARY:  
HISTORY:   
Past Medical History:  
Diagnosis Date Contact dermatitis and other eczema, due to unspecified cause Hypercholesterolemia Hypertension Past Surgical History: Procedure Laterality Date HX APPENDECTOMY  On license of [facility]  
 for BPH HX UROLOGICAL    
 IR CHOLECYSTOSTOMY PERCUTANEOUS SI  2021 Personal factors and/or comorbidities impacting plan of care: fall EXAMINATION/PRESENTATION/DECISION MAKING:  
Critical Behavior: 
Neurologic State: Alert, Confused Orientation Level: Oriented to person, Oriented to place Hearing: 
  
 
Range Of Motion: 
AROM: Within functional limits(wheelchair boud) PROM: Within functional limits(wheelchair boud) Strength:   
Strength: Within functional limits Tone & Sensation:  
  
  
  
  
  
  
  
  
  
   
Coordination: 
Coordination: Generally decreased, functional 
Vision:  
  
Functional Mobility: 
Bed Mobility: 
Rolling: Total assistance Supine to Sit: Total assistance Sit to Supine: Total assistance Scooting: Total assistance Transfers: 
  
 total 
     
  
     
  
  
Balance:  
Sitting: Impaired; With support Sitting - Static: Poor (constant support) Sitting - Dynamic: Poor (constant support) Ambulation/Gait Training: 
  
 Non ambulatory wheelchair bound Functional Measure: 
Barthel Index: 
 
Bathin Bladder: 0 Bowels: 0 Groomin Dressin Feedin Mobility: 0 Stairs: 0 Toilet Use: 0 Transfer (Bed to Chair and Back): 0 Total: 5/100 The Barthel ADL Index: Guidelines 1. The index should be used as a record of what a patient does, not as a record of what a patient could do. 2. The main aim is to establish degree of independence from any help, physical or verbal, however minor and for whatever reason. 3. The need for supervision renders the patient not independent. 4. A patient's performance should be established using the best available evidence. Asking the patient, friends/relatives and nurses are the usual sources, but direct observation and common sense are also important. However direct testing is not needed. 5. Usually the patient's performance over the preceding 24-48 hours is important, but occasionally longer periods will be relevant. 6. Middle categories imply that the patient supplies over 50 per cent of the effort. 7. Use of aids to be independent is allowed. Marci Vázquez., Barthel, DDiandraW. (8709). Functional evaluation: the Barthel Index. [address] [facility] (14)2. Elizabeth Arevalo sandra DELBERT Pugh, Tono Burrows., Stephenie Ly., Tamia, [address] [place] [address] Ave (). Measuring the change indisability after inpatient rehabilitation; comparison of the responsiveness of the Barthel Index and Functional [place] Measure. Journal of Neurology, Neurosurgery, and Psychiatry, 66(4), 535-534. Eliseo Hernandez, N.J.A, CÉSAR Gant.FRANCOIS, & Ervin Barrientos M.A. (2004.) Assessment of post-stroke quality of life in cost-effectiveness studies: The usefulness of the Barthel Index and the EuroQoL-5D. [facility], 13, 331-10 Physical Therapy Evaluation Charge Determination History Examination Presentation Decision-Making HIGH Complexity :3+ comorbidities / personal factors will impact the outcome/ POC  HIGH Complexity : 4+ Standardized tests and measures addressing body structure, function, activity limitation and / or participation in recreation  HIGH Complexity : Unstable and unpredictable characteristics  Other outcome measures barthel   HIGH Based on the above components, the patient evaluation is determined to be of the following complexity level: HIGH Pain Ratin/10 Activity Tolerance:  
Good After treatment patient left in no apparent distress: Supine in bed, Heels elevated for pressure relief, Call bell within reach, Bed / chair alarm activated, and Side rails x 3 
 
COMMUNICATION/EDUCATION:  
The patients plan of care was discussed with: Registered nurse. Fall prevention education was provided and the patient/caregiver indicated understanding. Thank you for this referral. 
Sierra Denny, PT,WCC. Time Calculation: 27 mins

## 2021-02-22 NOTE — PROGRESS NOTES
Physician Progress Note PATIENTVeto Service 
CSN #:                  [id number] :                       1/10/1930 ADMIT DATE:       2021 1:50 PM 
[address] DATE: 
RESPONDING 
PROVIDER #:        Cheryle Roach MD 
 
 
 
 
QUERY TEXT: 
 
Pt admitted with sepsis 2/2 cholecystitis and Pt noted to have laboratory test positive for COVID 19. If possible, please document in progress notes and discharge summary if covid 19 was  asymptomatic or symptomatic on admission (POA): The medical record reflects the following: 
Risk Factors: 81 yo M admitted with sepsis 2/2 cholecystitis now s/p percutaneous cholecystostomy tube Clinical Indicators:  PN states \"#SARS-CoV-2: COVID PCR returned positive, rapid was negative,  he likely has viral particles remaining, but is not likely contagious. \" Treatment: droplet pracuations Options provided: 
-- Yes, Asymptomatic COVID-19 was present at the time of the order to admit to the hospital 
-- Yes, Symptomatic COVID-19 was present at the time of the order to admit to the hospital 
-- No, COVID-19 was not present on admission, h/o Covid -19 only 
-- Other - I will add my own diagnosis -- Disagree - Not applicable / Not valid -- Disagree - Clinically unable to determine / Unknown 
-- Refer to Clinical Documentation Reviewer PROVIDER RESPONSE TEXT: 
 
[id number] was not present on admission , h/o Covid -19 only.  
 
Query created by: Cayla Summers on 2021 12:48 PM 
 
 
Electronically signed by:  Cheryle Roach MD 2021 5:51 PM

## 2021-02-22 NOTE — PROGRESS NOTES
Shift change Bedside and Verbal shift change report given to Matthew Nagy RN(oncoming nurse) by [address], RN (offgoing nurse). Report included the following information SBAR, Kardex and Recent Results. Shift Summary Disaster charting started Shift Change Bedside and verbal shift change report given to Lavell Rouse, [address] [place] [address] (oncoming nurse) by Matthew Nagy RN (offgoing nurse). Report included the following information SBAR, Kardex and Recent Results.

## 2021-02-23 NOTE — PROGRESS NOTES
Mr. Brian Garcia looks well. Will remove drain and perform cholecystectomy in one month. Discussed with daughter Joslyn Lopez) by phone.    
 
Glenys Trujillo MD

## 2021-02-23 NOTE — PROGRESS NOTES
[facility] visit. Mr. Millicent Art is [address]. He is unable to receive communion at this time due to medical restrictions. Prayer for spiritual communion offered outside his room. JON Arnett, RN, ACSW, LCSW  Page:  [phone]

## 2021-02-23 NOTE — PROGRESS NOTES
1900 Bedside and Verbal shift change report given to [address] (oncoming nurse) by Juice Graf (offgoing nurse). Report included the following information SBAR, Intake/Output, Recent Results and Cardiac Rhythm A-fib. 1940 Patient insistent on sitting on side of bed to eat dinner. Stated he could not eat sitting straight up in the bed. Assisted patient with Maritza Ramsey RN, to side of bed and set up to eat. Patient finished 75% clear liquid tray. Got patient back into bed. Cleaned up bowel movement, changed gown and pad, and did CHG bath and catheter care with ADRIANA Howard. 0700 Bedside and Verbal shift change report given to [facility] (oncoming nurse) by [facility] (offgoing nurse). Report included the following information SBAR, Intake/Output and Recent Results.

## 2021-02-23 NOTE — PROGRESS NOTES
Josiah Gill [facility] 
[facility], [place], [address] [place] [address] 
[phone] Medical Progress Note NAME: Keshia Escalera :  1/10/1930 MRM:  [id number] Date/Time: 2021 2:40 PM 
 
 
  
Assessment / Plan: #Sepsis: Resolved. Most likely source is gallbladder; however, also with mildly abnl UA. WBC has improved as has mentation.  
 - Appreciate Surg recs - Perc pérez , plan to dc with drain and f/u with surgery in 2 weeks to schedule lap pérez - Pip/tazo  -  
  - Changed to IV levo/flagyl  To PO today - F/u UCx, BCx, bile cx #Cholecystitis: Now s/p perc pérez. Plan dc with drain, f./u surg in 2 weeks to schedule lap pérez in 4 weeks. Advance diet. #AFwRVR: Further hx review suggests pAF. INR elevated on arrival, but this was most likely e/o coagulopathy in s/o above as pt does not take warfarin. Required amio gtt initially, but HR has now improved with treatment of sepsis  
 - d/c amio - Metop 25mg bid as outpt, continued #HAMLET: FENa pre-renal. No hx CHF, does not appear volume overloaded; doubt cardiorenal.  
 - Gentle fluids again today #Urinary Retention: > 500cc pvr. Known BPH. Potential source of infection. Doubt obstructive hamlet 
 - will dc ernst and do void trial 
 -flomax #SARS-CoV-2: COVID PCR returned positive, rapid was negative. As PCR is much more sensitive than Ag testing, he likely has viral particles remaining, but is not likely contagious. Regardless, implement droplet plus precautions out of an abundance of caution. #Hypokalemia: Repleted #Elevated INR: Favor DIC/Coagulopathy in s/o above. No utility in reversing #Hypothyroidism: 
 - Cont home LT4 Total time spent with patient: 30 Minutes Care Plan discussed with: Patient and Family Discussed:  Code Status and Care Plan Prophylaxis:  Lovenox Disposition:  Home w/Family ___________________________________________________ Attending Physician: Toney Carolina MD  
 
  
Subjective: Chief Complaint:  He has no complaints and wants to go home ROS: 
(bold if positive, if negative) Objective:  
 
 
Vitals:  
 
 
  
Last 24hrs VS reviewed since prior progress note. Most recent are: 
 
Visit Vitals /66 (BP 1 Location: Right arm, BP Patient Position: At rest) Pulse 67 Temp 98.6 °F (37 °C) Resp 25 Ht 5' 4\" (1.626 m) Wt 83.5 kg (184 lb) SpO2 94% BMI 31.58 kg/m² SpO2 Readings from Last 6 Encounters:  
02/23/21 94% 01/16/14 97% 11/14/13 96% 10/18/13 97% 10/17/13 95% 10/14/13 99% Intake/Output Summary (Last 24 hours) at 2/23/2021 1752 Last data filed at 2/23/2021 1654 Gross per 24 hour Intake  Output 500 ml Net -500 ml Exam:  
 
Physical Exam: 
 
Gen:  Well-developed, well-nourished, in no acute distress HEENT:  MMM Neck:  Supple, without masses, thyroid non-tender Resp:  No accessory muscle use, clear breath sounds without wheezes rales or rhonchi 
Card:  No murmurs, normal S1, S2 without thrills, bruits or peripheral edema Abd:  Soft, Non tender, no rebound or guarding Musc:  No cyanosis or clubbing Skin:  No rashes or ulcers, skin turgor is good Neuro:  Cranial nerves 3-12 are grossly intact,  strength is 5/5 bilaterally and dorsi / plantarflexion is 5/5 bilaterally, follows commands appropriately Psych:  Good insight, oriented x 3 and context Telemetry reviewed:   AFIB Medications Reviewed: (see below) Lab Data Reviewed: (see below) 
 
______________________________________________________________________ Medications:  
 
Current Facility-Administered Medications Medication Dose Route Frequency  levoFLOXacin (LEVAQUIN) tablet 750 mg  750 mg Oral Q48H  
 metroNIDAZOLE (FLAGYL) tablet 500 mg  500 mg Oral TID  metoprolol tartrate (LOPRESSOR) tablet 50 mg  50 mg Oral BID  
  enoxaparin (LOVENOX) injection 40 mg  40 mg SubCUTAneous Q24H  
 sodium chloride (NS) flush 5-40 mL  5-40 mL IntraVENous Q8H  
 sodium chloride (NS) flush 5-40 mL  5-40 mL IntraVENous PRN  
 acetaminophen (TYLENOL) tablet 650 mg  650 mg Oral Q6H PRN Or  
 acetaminophen (TYLENOL) suppository 650 mg  650 mg Rectal Q6H PRN  polyethylene glycol (MIRALAX) packet 17 g  17 g Oral DAILY PRN  
 bisacodyL (DULCOLAX) suppository 10 mg  10 mg Rectal DAILY PRN  
 ondansetron (ZOFRAN) injection 4 mg  4 mg IntraVENous Q6H PRN  pantoprazole (PROTONIX) 40 mg in 0.9% sodium chloride 10 mL injection  40 mg IntraVENous Q12H  
 labetaloL (NORMODYNE;TRANDATE) 20 mg/4 mL (5 mg/mL) injection 20 mg  20 mg IntraVENous Q4H PRN  
 hydrALAZINE (APRESOLINE) 20 mg/mL injection 20 mg  20 mg IntraVENous Q4H PRN  
 melatonin (rapid dissolve) tablet 5 mg  5 mg Oral QHS PRN  
 alum-mag hydroxide-simeth (MYLANTA) oral suspension 30 mL  30 mL Oral Q4H PRN  
 LORazepam (ATIVAN) injection 0.5 mg  0.5 mg IntraVENous Q6H PRN  
 oxyCODONE IR (ROXICODONE) tablet 5 mg  5 mg Oral Q4H PRN  
 oxyCODONE IR (ROXICODONE) tablet 10 mg  10 mg Oral Q4H PRN  
 morphine injection 2 mg  2 mg IntraVENous Q4H PRN  
 diphenhydrAMINE (BENADRYL) injection 25 mg  25 mg IntraVENous Q6H PRN  
 diphenhydrAMINE (BENADRYL) capsule 25 mg  25 mg Oral Q6H PRN  
 albuterol (PROVENTIL VENTOLIN) nebulizer solution 2.5 mg  2.5 mg Nebulization Q4H PRN  
 simethicone (MYLICON) tablet 80 mg  80 mg Oral QID PRN  
 levothyroxine (SYNTHROID) tablet 50 mcg  50 mcg Oral ACB Lab Review:  
 
Recent Labs  
  02/23/21 
0036 02/22/21 
0452 02/21/21 
0600 WBC 9.0 9.6 15.2* HGB 11.1* 11.9* 12.6 HCT 31.8* 34.4* 37.1  245 255 Recent Labs  
  02/23/21 
0036 02/22/21 
0452 02/21/21 
0500  143 144  
K 3.0* 3.2* 3.5 * 111* 115* CO2 22 24 18* * 70 111* BUN 32* 29* 27* CREA 1.57* 1.51* 1.36* CA 8.1* 8.1* 8.0*  
 ALB 1.7* 1.8* 1.8* ALT 24 29 23 No components found for: [place]

## 2021-02-23 NOTE — PROGRESS NOTES
2/23/2021 
2:53 PM  COVID 19 + Update via chart review, pt is continuing to require medical management for sepsis, acute cholecystitis, AFib w/ RVR, HAMLET Transitions of Care Plan:         COVID 19 + RUR 19 % LOS 5 Days 1. Acute cholecystitis, surgery, following,Cholecystostomy tube in place, 
2. Sepsis resolved on PO ABx 3. COVID 19 + via PCR, 2/19 4. DC when stable, pt resides w/ wife at [address] [place] [address] spoke w/ Health/ Jose J Tsai [phone], they will need to review clinicals prior to DC to see if they can meet pt's care needs; current recommendation is for [facility] at DC 
5. Outpatient f/u PCPsteve 6. Transport TBD 7. CM to follow through for treatment/response KASIE Porter

## 2021-02-23 NOTE — CONSULTS
Comprehensive Nutrition Assessment Type and Reason for Visit: Initial, Consult Nutrition Recommendations/Plan: 1. Advance diet as medically feasible. Recommend goal of regular, low-fat. Pt currently NPO/CLD x5 days, if unable to advance diet within 24-48 hours consider need for nutrition support. 2. Sending Ensure Clear TID while on CLD to promote intake. Nutrition Assessment:    
2/23: RD consult received for poor appetite/intake x5 or more days and weight loss. Pt admitted with severe sepsis, cholecystitis. PMH includes HTN, afib. Pt s/p percutaneous cholecystostomy tube. Pt +COVID-19, no answer when attempting to call pt's room phone x multiple attempts. No recent weight hx in chart to confirm weight loss, though weight appears to be the same as in 2013 per EMR. Add Ensure Clear TID while on clears. Recommend advancing diet or starting nutrition support. Please c/s if recs needed. Labs- K 3.0, Mg 1.5, -181-70, Cr 1.57. Meds- synthroid, flagyl, Protonix. Weight hx: Wt Readings from Last 10 Encounters:  
02/23/21 83.5 kg (184 lb)  
11/14/13 83.9 kg (185 lb) 10/14/13 77.1 kg (170 lb)  
03/22/12 81.6 kg (180 lb) Malnutrition Assessment: 
Malnutrition Status: insufficient data Estimated Daily Nutrient Needs: 
Energy (kcal): 1820(1400 x 1.3 AF); Weight Used for Energy Requirements: Current Protein (g): 84(1-1.2 g/kg); Weight Used for Protein Requirements: Current Fluid (ml/day): 1820; Method Used for Fluid Requirements: 1 ml/kcal 
 
 
Nutrition Related Findings:  lats BM 2/22 Wounds:   
None Current Nutrition Therapies: DIET CLEAR LIQUID 
DIET NUTRITIONAL SUPPLEMENTS Breakfast, Lunch, Dinner; Ensure Clear Anthropometric Measures: 
· Height:  5' 4\" (162.6 cm) · Current Body Wt:  83.5 kg (184 lb) · Admission Body Wt:      
· Usual Body Wt:       
· Ideal Body Wt:  130 lbs:  141.5 % · Adjusted Body Weight:   ; Weight Adjustment for: No adjustment · Adjusted BMI:      
· BMI Category:  Obese class 1 (BMI 30.0-34. 9) Nutrition Diagnosis:  
· Inadequate oral intake related to inadequate protein-energy intake as evidenced by poor intake prior to admission, NPO or clear liquid status due to medical condition Nutrition Interventions:  
Food and/or Nutrient Delivery: Modify current diet, Start oral nutrition supplement Nutrition Education and Counseling: No recommendations at this time Coordination of Nutrition Care: Continue to monitor while inpatient Goals: 
Diet advancement with po intakes >50% meals + ONS next 1-3 days Nutrition Monitoring and Evaluation:  
Behavioral-Environmental Outcomes: None identified Food/Nutrient Intake Outcomes: Diet advancement/tolerance, Food and nutrient intake, Supplement intake Physical Signs/Symptoms Outcomes: Weight, Biochemical data, Nutrition focused physical findings, GI status Discharge Planning: Too soon to determine Electronically signed by Alie Barahona RDN on 2/23/2021 at 2:40 PM 
 
Contact: [phone]

## 2021-02-23 NOTE — PROGRESS NOTES
Shift Change:  
  
Bedside and Verbal shift change report given to [address] [place] (oncoming nurse) by Gabe Bobby RN (offgoing nurse). Report included the following information SBAR, Kardex and Recent Results. Shift Summary: 
 
1336: patients wife Uriah Section called for an update on patient condition, MD notified 1830: ernst removed, flomax given, patient states he does not feel like he needs to urinate Shift Change:  
  
Bedside and Verbal shift change report given to [address] (oncoming nurse) by [address] [place] (offgoing nurse). Report included the following information SBAR, Kardex and Recent Results.

## 2021-02-23 NOTE — PROGRESS NOTES
Due to Contact Restrictions did not enter the room. Provided prayer at [facility] 5 patient's doorway. Did not include sacramental care. Bereket Coombs

## 2021-02-24 NOTE — PROGRESS NOTES
2/24/2021  
12:04 PM 
CM discussed pt w/ Dr Jocelyne Main currently having urinary retention, urology has been consulted, expect DC in next 24 H. Transitions of Care Plan:         COVID 19 + RUR 18 % LOS 6 Days 1. Acute cholecystitis, surgery, following,Cholecystostomy tube in place, plan to DC w/ tube and outpatient f/u 2. Sepsis resolved on PO ABx 3. Urinary retention, Ethan Pelaez has been consulted 4. DC when stable,CM faxed clinicals to [facility] for review 5. Outpatient f/u PCP, surgery 6. Pt is total assist ,  placed on will call for stretcher w/ [facility] 2/25/21, packet to bedside chart. KASIE Hidalgo  11:50 AM 
CM spoke w/ PT pt is total assist, she will put in note to send to [place] for review. CM placed TC to pt's wife Peter Stewart [phone] to discuss DC plan, LVM Will follow and assist 
KASIE Hidalgo  11:47 AM 
Pt discussed in IDR rounds, possibly stable for DC today to [facility], CM faxed updates to [address] [place] [address] [phone] for review. KASIE Hidalgo

## 2021-02-24 NOTE — PROGRESS NOTES
Problem: Mobility Impaired (Adult and Pediatric) Goal: *Acute Goals and Plan of Care (Insert Text) Description: FUNCTIONAL STATUS PRIOR TO ADMISSION: The patient was functional at the wheelchair level and required minimal assistance for transfers to the chair. HOME SUPPORT PRIOR TO ADMISSION: The patient lived with spouse. Physical Therapy Goals Initiated 2/22/2021 1. Patient will move from supine to sit and sit to supine , scoot up and down, and roll side to side in bed with modified independence within 7 day(s). 2.  Patient will transfer from bed to wheelchair/chair and wheelchair/chair to bed with modified independence using the least restrictive device or a sliding board within 7 day(s). Outcome: Progressing Towards Goal 
 PHYSICAL THERAPY TREATMENT Patient: Maurice Loya (80 y.o. male) Date: 2/24/2021 Diagnosis: Severe sepsis ([facility] [address] [A41.9, R65.20] Lactic acidosis [E87.2] Acute cholecystitis [K81.0] RUQ pain [R10.11] Acute epigastric pain [R10.13] Abdominal pain, acute, right upper quadrant [R10.11] Hyperbilirubinemia [E80.6] Hypokalemia [E87.6] Leukocytosis [D72.829] Atrial fibrillation ([address] [I48.91] GIB (gastrointestinal bleeding) [K92.2] Acute metabolic encephalopathy [N71.19] <principal problem not specified> Precautions:   
Chart, physical therapy assessment, plan of care and goals were reviewed. ASSESSMENT Patient continues with skilled PT services and is not progressing towards goals. Pt remains limited by confusion, high fall risk, decreased activity tolerance, inability to perform bed mobility without total A and once again declining slide board transfer attempt. Attempted lateral scooting at EOB though pt requires Max A to make any progress. Questionable historian, pt reports he is able to walk up to 10-15 feet and home with canes but RLE non-functional and can not clear any portion of hips with two standing attempts. Pt becoming irritable and deferred any further attempts at mobility. If SUE can not provide assistance for all mobility he may require SNF placement. Current Level of Function Impacting Discharge (mobility/balance): can sit with supervision but total A for EOB Other factors to consider for discharge: high fall risk, unable to perform transfer PLAN : 
Patient continues to benefit from skilled intervention to address the above impairments. Continue treatment per established plan of care. to address goals. Recommendation for discharge: (in order for the patient to meet his/her long term goals) Therapy up to 5 days/week in SNF setting This discharge recommendation: 
Has been made in collaboration with the attending provider and/or case management IF patient discharges home will need the following DME: mechanical lift SUBJECTIVE:  
Patient stated I don't know why we are even doing this.  OBJECTIVE DATA SUMMARY:  
Critical Behavior: 
Neurologic State: Alert Orientation Level: Oriented to person, Oriented to place, Oriented to situation Cognition: Follows commands Functional Mobility Training: 
Bed Mobility: 
Rolling: Total assistance Supine to Sit: Total assistance Sit to Supine: Total assistance Scooting: Total assistance Transfers: 
  
  
     
  
     
  
  
  
  
Balance: 
Sitting: Impaired Sitting - Static: Fair (occasional) Sitting - Dynamic: Poor (constant support) Ambulation/Gait Training: 
  
  
  
  
  
  
  
  
  
  
  
  
  
  
 
 
Activity Tolerance:  
Fair and requires rest breaks After treatment patient left in no apparent distress:  
Supine in bed, Heels elevated for pressure relief, Patient positioned in right sidelying for pressure relief, Call bell within reach, Bed / chair alarm activated, and Side rails x 3 
 
COMMUNICATION/COLLABORATION:  
The patients plan of care was discussed with: Registered nurse. Tahira Hernandez, PT Time Calculation: 19 mins

## 2021-02-24 NOTE — CONSULTS
New Urology Consult Note Patient: Lu Copeland MRN: [id number]  SSN: [id number] YOB: 1930  Age: 80 y.o. Sex: male Assessment:  
 
Lu Copeland is a 80 y.o. male admitted for SEPSIS, vomiting, lethargy. Urology consulted for urinary retention and failed VT. Nurse Hematuria, acute urinary retention, HAMLET in the setting of solitary kidney, LEFT renal cyst vs mass, BPH Recommendations: 1. Urinary retention- F/C (550ml) for 5-7 days; ok to f/u for outpt VT if needed. 2. HAMLET - Cr 1.28 on arrival, trending up since admission. F/C was placed 02/19/21- doesn't correlate with CULVER as cause for worsening renal function. 3. Left renal cyst vs mass- slightly larger when compared to prior study in 2009 4. Continue Flomax 5. Urine culture No acute urologic intervention needed at this time. He will need out pt f/u once his acute medical issues have resolved. Thank you for this consult. Please contact [facility] with any further questions/concerns. [facility] Grissom[facility] [phone] History of Present Illness:  
 
Reason for Consult:  Urinary retention Lu Copeland is seen in consultation for reasons noted above at the request of Gerson Marshall MD. This is a 80 y.o. male with a history of HTN, HLD, Afib (on Coumadin), hypothyroid,solitary left kidney, BPH with urinary obstruction. He admitted for sepsis, suspected source is cholecystitis, vomiting coffee ground emesis, and lethargy. He is w/c bound for history of Polio. He is alert/oriented and answers all questions appropriately. He denies any complaints, including Dysuria, abd pain, distention, or urgency. He was last seen in the office in 2015- for a renal cyst.  he reports a history of prostate resection many years ago. Subjective Past Medical History Past Medical History:  
Diagnosis Date  Contact dermatitis and other eczema, due to unspecified cause  Hypercholesterolemia  Hypertension Past Surgical History:  
Past Surgical History:  
Procedure Laterality Date  HX APPENDECTOMY  HX PROSTATECTOMY  1986  
 for BPH  
 HX UROLOGICAL    
 IR CHOLECYSTOSTOMY PERCUTANEOUS SI  2/19/2021 Medication: 
Current Facility-Administered Medications Medication Dose Route Frequency Provider Last Rate Last Admin  
 0.45% sodium chloride infusion  75 mL/hr IntraVENous CONTINUOUS Marisa Needles, DO 75 mL/hr at 02/24/21 0431 75 mL/hr at 02/24/21 0431  tamsulosin (FLOMAX) capsule 0.4 mg  0.4 mg Oral DAILY Gerson Marshall MD   0.4 mg at 02/24/21 0130  levoFLOXacin (LEVAQUIN) tablet 750 mg  750 mg Oral Q48H Radha Sue MD   750 mg at 02/23/21 4938  
 metroNIDAZOLE (FLAGYL) tablet 500 mg  500 mg Oral TID Radha Sue MD   500 mg at 02/24/21 0931  
 metoprolol tartrate (LOPRESSOR) tablet 50 mg  50 mg Oral BID Radha Sue MD   50 mg at 02/24/21 2953  enoxaparin (LOVENOX) injection 40 mg  40 mg SubCUTAneous Q24H Radha Sue MD   40 mg at 02/23/21 2118  sodium chloride (NS) flush 5-40 mL  5-40 mL IntraVENous Q8H Carlin Sexton MD   10 mL at 02/24/21 1345  sodium chloride (NS) flush 5-40 mL  5-40 mL IntraVENous PRN Carlin Sexton MD      
 acetaminophen (TYLENOL) tablet 650 mg  650 mg Oral Q6H PRN Carlin Sexton MD      
 Or  
 acetaminophen (TYLENOL) suppository 650 mg  650 mg Rectal Q6H PRN Carlin Sexton MD      
 polyethylene glycol [facility]) packet 17 g  17 g Oral DAILY PRN Carlin Sexton MD      
 bisacodyL (DULCOLAX) suppository 10 mg  10 mg Rectal DAILY PRN Carlin Sexton MD      
 [facility]ansetron [facility]) injection 4 mg  4 mg IntraVENous Q6H PRN Carlin Sexton MD      
 pantoprazole (PROTONIX) 40 mg in 0.9% sodium chloride 10 mL injection  40 mg IntraVENous Q12H Carlin Sexton MD   40 mg at 02/24/21 0931  
 labetaloL (NORMODYNE;TRANDATE) 20 mg/4 mL (5 mg/mL) injection 20 mg  20 mg IntraVENous Q4H PRN Marv Reddy MD      
 hydrALAZINE (APRESOLINE) 20 mg/mL injection 20 mg  20 mg IntraVENous Q4H PRN Marv Reddy MD   20 mg at 02/20/21 2043  
 melatonin (rapid dissolve) tablet 5 mg  5 mg Oral QHS PRN Marv Reddy MD      
 alum-mag hydroxide-[facility]) oral suspension 30 mL  30 mL Oral Q4H PRN Marv Reddy MD      
 LORazepam (ATIVAN) injection 0.5 mg  0.5 mg IntraVENous Q6H PRN Marv Reddy MD   0.5 mg at 02/24/21 1345  oxyCODONE IR (ROXICODONE) tablet 5 mg  5 mg Oral Q4H PRN Marv Reddy MD      
 oxyCODONE IR (ROXICODONE) tablet 10 mg  10 mg Oral Q4H PRN Marv Reddy MD      
 morphine injection 2 mg  2 mg IntraVENous Q4H PRN Marv Reddy MD   2 mg at 02/19/21 1446  diphenhydrAMINE (BENADRYL) injection 25 mg  25 mg IntraVENous Q6H PRN Marv Reddy MD      
 diphenhydrAMINE (BENADRYL) capsule 25 mg  25 mg Oral Q6H PRN Marv Reddy MD      
 albuterol (PROVENTIL VENTOLIN) nebulizer solution 2.5 mg  2.5 mg Nebulization Q4H PRN Marv Reddy MD      
 [facility]) tablet 80 mg  80 mg Oral QID PRN Marv Reddy MD      
 levothyroxine (SYNTHROID) tablet 50 mcg  50 mcg Oral ACB Marv Reddy MD   50 mcg at 02/24/21 5341 Allergies: 
No Known Allergies Social History: 
Social History Tobacco Use  Smoking status: Never Smoker  Smokeless tobacco: Never Used Substance Use Topics  Alcohol use: Yes Comment: 2 OZ A DAY  Drug use: No  
 
 
Family History Family History Problem Relation Age of Onset  Cancer Mother Review of Systems ROS from attending provider note from 02/18/2021 reviewed and changes (other than per HPI) include : none. Objective:  
 
Vital signs in last 24 hours: 
Visit Vitals /66 Pulse 80 Temp 97.4 °F (36.3 °C) Resp 22 Ht 5' 4\" (1.626 m) Wt 83.5 kg (184 lb) SpO2 95% BMI 31.58 kg/m² Intake/Output last 3 shifts: 
Date 02/23/21 0700 - 02/24/21 9708 02/24/21 0700 - 02/25/21 6982 Shift [id number] 1900-0659 24 Hour Total 9890-7428 0486-6286 24 Hour Total  
INTAKE  
P.O.    150  150  
  P. O.    150  150 Shift Total(mL/kg)    150(1.8)  150(1.8) OUTPUT Drains  10 10 Output (ml) [facility] Resolve 02/19/21 Right Other (comment))  10 10 Stool  1 1 Stool Occurrence(s)  1 x 1 x 1 x  1 x Stool  1 1 Shift Total(mL/kg)  11(0.1) 11(0.1) NET  -11 -11 150  150 Weight (kg) 83.5 83.5 83.5 83.5 83.5 83.5 Physical Exam 
General: NAD, A&O HEENT: lids normal, no tracheal deviation, no lesions or deformities Pulmonary: Normal work of breathing Abdomen: soft, NTTP, nondistended, no suprapubic fullness or tenderness : concentrated urine, ?old bloody sediment, no CVA tenderness WILL: unable to palpate prostate, no rectal pain/tenderness with exam  
Gait: not observed Neuro: Appropriate, no focal neurological deficits Mood/Affect: normal 
 
Lab/Imaging Review: Most Recent Labs: 
Lab Results Component Value Date/Time WBC 11.6 (H) 02/24/2021 04:36 AM  
 Hemoglobin (POC) 15.3 10/16/2013 05:19 PM  
 Hemoglobin (POC) 15.6 10/14/2013 07:07 PM  
 HGB 12.3 02/24/2021 04:36 AM  
 Hematocrit (POC) 46 10/14/2013 07:07 PM  
 HCT 34.9 (L) 02/24/2021 04:36 AM  
 PLATELET 362 10/66/1035 04:36 AM  
 MCV 75.2 (L) 02/24/2021 04:36 AM  
  
 
Lab Results Component Value Date/Time  Sodium 145 02/24/2021 04:36 AM  
 Potassium 3.3 (L) 02/24/2021 04:36 AM  
 Chloride 113 (H) 02/24/2021 04:36 AM  
 CO2 24 02/24/2021 04:36 AM  
 Anion gap 8 02/24/2021 04:36 AM  
 Glucose 86 02/24/2021 04:36 AM  
 Glucose 102 01/19/2010 10:14 AM  
 BUN 30 (H) 02/24/2021 04:36 AM  
 Creatinine 1.42 (H) 02/24/2021 04:36 AM  
 BUN/Creatinine ratio 21 (H) 02/24/2021 04:36 AM  
 GFR est AA 57 (L) 02/24/2021 04:36 AM  
 GFR est non-AA 47 (L) 02/24/2021 04:36 AM  
 Calcium 7.8 (L) 02/24/2021 04:36 AM  
 Bilirubin, total 1.1 (H) 02/24/2021 04:36 AM  
 Alk. phosphatase 66 02/24/2021 04:36 AM  
 Protein, total 5.0 (L) 02/24/2021 04:36 AM  
 Albumin 1.9 (L) 02/24/2021 04:36 AM  
 Globulin 3.1 02/24/2021 04:36 AM  
 A-G Ratio 0.6 (L) 02/24/2021 04:36 AM  
 ALT (SGPT) 19 02/24/2021 04:36 AM  
  
 
No results found for: PSA, Pearletha Running, [place], [id number], [id number], [id number], PSAEXT COAGS:  No results found for: APTT, PTP, INR, INREXT Lab Results Component Value Date/Time Hemoglobin A1c 5.0 03/27/2013 10:50 AM  
  
 
Lab Results Component Value Date/Time Troponin-I, Qt. <0.05 02/18/2021 02:23 PM  
  
 
 
Urine/Blood Cultures: 
Results Procedure Component Value Units Date/Time Gavi Mitchell [[id number]] Collected: 02/19/21 1715 Order Status: Completed Specimen: Bile Updated: 02/23/21 1042 Special Requests: NO SPECIAL REQUESTS Culture result: NO GROWTH 4 DAYS     
 CULTURE, TISSUE Gorge Dustyie [[id number]] Collected: 02/19/21 1715 Order Status: Canceled Specimen: Gallbladder Reita Mages STAIN [[id number]] Collected: 02/19/21 1715 Order Status: Completed Specimen: Gallbladder Updated: 02/23/21 1042 Special Requests: NO SPECIAL REQUESTS     
  GRAM STAIN NO WBC'S SEEN     
   NO ORGANISMS SEEN Culture result: NO GROWTH 4 DAYS     
 SARS-COV-2, PCR [[id number]]  (Abnormal) Collected: 02/19/21 0730 Order Status: Completed Specimen: Nasopharyngeal Updated: 02/20/21 1026 Specimen source Nasopharyngeal     
  SARS-CoV-2 Detected Comment:     
The specimen is POSITIVE for SARS-CoV-2, the novel coronavirus associated with COVID-19. This test has been authorized by the FDA under an Emergency Use Authorization (EUA) for use by authorized laboratories. Fact sheet for Healthcare Providers: ConventionUpdate.co.nz Fact sheet for Patients: https://fda.gov/media/323404/download Methodology: RT-PCR 
  
  
 COVID-19 RAPID TEST [[id number]] Collected: 02/18/21 2211 Order Status: Completed Specimen: Nasopharyngeal Updated: 02/18/21 2241 Specimen source Nasopharyngeal     
  COVID-19 rapid test Not detected Comment: Rapid Abbott ID Now Rapid NAAT:  The specimen is NEGATIVE for SARS-CoV-2, the novel coronavirus associated with COVID-19. Negative results should be treated as presumptive and, if inconsistent with clinical signs and symptoms or necessary for patient management, should be tested with an alternative molecular assay. Negative results do not preclude SARS-CoV-2 infection and should not be used as the sole basis for patient management decisions. This test has been authorized by the FDA under an Emergency Use Authorization (EUA) for use by authorized laboratories. Fact sheet for Healthcare Providers: ConventionUpdate.co.nz Fact sheet for Patients: ConventionUpdate.co.nz Methodology: Isothermal Nucleic Acid Amplification CULTURE, BLOOD, PAIRED [[id number]] Collected: 02/18/21 2130 Order Status: Completed Specimen: Blood Updated: 02/23/21 0525 Special Requests: NO SPECIAL REQUESTS Culture result: NO GROWTH 5 DAYS IMAGING: 
Right kidney is absent. Left kidney shows no mass, stone or hydronephrosis. IMPRESSION 1. No active GI bleeding. 2. Suspect acute cholecystitis. 3. Gastric distention. 4. Chronic bladder outlet obstruction. Signed By: Vinicius Brooks NP  - February 24, 2021

## 2021-02-24 NOTE — PROGRESS NOTES
0400: Messaged Dr. Charlee Fabian: pt ernst removed yesterday at 1800 for voiding trial. Pt has not had any urine output. Bladder scanned twice at 0000: 99ml and now has 167ml. He is not receiving any fluid. It was originally placed due to chronic bladder outlet obstruction and was difficult to place. Orders placed by MD for labs and fluid. 700: Bedside shift change report given to Perfecto Camacho RN (oncoming nurse) by Bon Valverde RN (offgoing nurse). Report included the following information SBAR, Kardex, Intake/Output, Recent Results and Cardiac Rhythm A-fib.

## 2021-02-24 NOTE — PROGRESS NOTES
Josiah Gill [facility] [address] 
[address] [facility], [address] [place] [address] 
[phone] Medical Progress Note NAME: Patricia Lombardo :  1/10/1930 MRM:  [id number] Date/Time: 2021 2:40 PM 
 
 
  
Assessment / Plan: #Sepsis: Resolved. Most likely source is gallbladder; however, also with mildly abnl UA. WBC has improved as has mentation.  
 - Appreciate Surg recs - Perc pérez , plan to dc with drain and f/u with surgery in 2 weeks to schedule lap pérez - Pip/tazo  -  
  - Changed to IV levo/flagyl  To PO today - F/u UCx, BCx, bile cx #Cholecystitis: Now s/p perc pérez. Plan dc with drain, f./u surg in 2 weeks to schedule lap pérez in 4 weeks. Advance diet. #AFwRVR: Further hx review suggests pAF. INR elevated on arrival, but this was most likely e/o coagulopathy in s/o above as pt does not take warfarin. Required amio gtt initially, but HR has now improved with treatment of sepsis  
 - d/c amio - Metop 25mg bid as outpt, continued #HAMLET: FENa pre-renal. No hx CHF, does not appear volume overloaded; doubt cardiorenal.  
 - Gentle fluids again today #Urinary Retention: > 500cc pvr. Known BPH. Potential source of infection. Doubt obstructive hamlet 
 - failed void trial, reinserted ernst, urology consult #SARS-CoV-2: COVID PCR returned positive, rapid was negative. As PCR is much more sensitive than Ag testing, he likely has viral particles remaining, but is not likely contagious. Regardless, implement droplet plus precautions out of an abundance of caution. #Hypokalemia: Repleted #Elevated INR: Favor DIC/Coagulopathy in s/o above. No utility in reversing #Hypothyroidism: 
 - Cont home LT4 Total time spent with patient: 30 Minutes Care Plan discussed with: Patient and Family Discussed:  Code Status and Care Plan Prophylaxis:  Lovenox Disposition:  Home w/Family ___________________________________________________ Attending Physician: Partha Brunson MD  
 
  
Subjective: Chief Complaint:  He has no complaints and wants to go home Objective:  
 
 
Vitals:  
 
 
  
Last 24hrs VS reviewed since prior progress note. Most recent are: 
 
Visit Vitals /66 Pulse 68 Temp 97.4 °F (36.3 °C) Resp 22 Ht 5' 4\" (1.626 m) Wt 83.5 kg (184 lb) SpO2 95% BMI 31.58 kg/m² SpO2 Readings from Last 6 Encounters:  
02/24/21 95% 01/16/14 97% 11/14/13 96% 10/18/13 97% 10/17/13 95% 10/14/13 99% Intake/Output Summary (Last 24 hours) at 2/24/2021 1540 Last data filed at 2/24/2021 3881 Gross per 24 hour Intake 150 ml Output 11 ml Net 139 ml Exam:  
 
Physical Exam: 
 
Gen:  Well-developed, well-nourished, in no acute distress HEENT:  MMM Neck:  Supple, without masses, thyroid non-tender Resp:  No accessory muscle use, clear breath sounds without wheezes rales or rhonchi 
Card:  No murmurs, normal S1, S2 without thrills, bruits or peripheral edema Abd:  Soft, Non tender, no rebound or guarding Musc:  No cyanosis or clubbing Skin:  No rashes or ulcers, skin turgor is good Neuro:  Cranial nerves 3-12 are grossly intact,  strength is 5/5 bilaterally and dorsi / plantarflexion is 5/5 bilaterally, follows commands appropriately Psych:  Good insight, oriented x 3 and context Telemetry reviewed:   AFIB Medications Reviewed: (see below) Lab Data Reviewed: (see below) 
 
______________________________________________________________________ Medications:  
 
Current Facility-Administered Medications Medication Dose Route Frequency  0.45% sodium chloride infusion  75 mL/hr IntraVENous CONTINUOUS  
 tamsulosin (FLOMAX) capsule 0.4 mg  0.4 mg Oral DAILY  levoFLOXacin (LEVAQUIN) tablet 750 mg  750 mg Oral Q48H  
 metroNIDAZOLE (FLAGYL) tablet 500 mg  500 mg Oral TID  metoprolol tartrate (LOPRESSOR) tablet 50 mg  50 mg Oral BID  enoxaparin (LOVENOX) injection 40 mg  40 mg SubCUTAneous Q24H  
 sodium chloride (NS) flush 5-40 mL  5-40 mL IntraVENous Q8H  
 sodium chloride (NS) flush 5-40 mL  5-40 mL IntraVENous PRN  
 acetaminophen (TYLENOL) tablet 650 mg  650 mg Oral Q6H PRN Or  
 acetaminophen (TYLENOL) suppository 650 mg  650 mg Rectal Q6H PRN  polyethylene glycol (MIRALAX) packet 17 g  17 g Oral DAILY PRN  
 bisacodyL (DULCOLAX) suppository 10 mg  10 mg Rectal DAILY PRN  
 ondansetron (ZOFRAN) injection 4 mg  4 mg IntraVENous Q6H PRN  pantoprazole (PROTONIX) 40 mg in 0.9% sodium chloride 10 mL injection  40 mg IntraVENous Q12H  
 labetaloL (NORMODYNE;TRANDATE) 20 mg/4 mL (5 mg/mL) injection 20 mg  20 mg IntraVENous Q4H PRN  
 hydrALAZINE (APRESOLINE) 20 mg/mL injection 20 mg  20 mg IntraVENous Q4H PRN  
 melatonin (rapid dissolve) tablet 5 mg  5 mg Oral QHS PRN  
 alum-mag hydroxide-simeth (MYLANTA) oral suspension 30 mL  30 mL Oral Q4H PRN  
 LORazepam (ATIVAN) injection 0.5 mg  0.5 mg IntraVENous Q6H PRN  
 oxyCODONE IR (ROXICODONE) tablet 5 mg  5 mg Oral Q4H PRN  
 oxyCODONE IR (ROXICODONE) tablet 10 mg  10 mg Oral Q4H PRN  
 morphine injection 2 mg  2 mg IntraVENous Q4H PRN  
 diphenhydrAMINE (BENADRYL) injection 25 mg  25 mg IntraVENous Q6H PRN  
 diphenhydrAMINE (BENADRYL) capsule 25 mg  25 mg Oral Q6H PRN  
 albuterol (PROVENTIL VENTOLIN) nebulizer solution 2.5 mg  2.5 mg Nebulization Q4H PRN  
 simethicone (MYLICON) tablet 80 mg  80 mg Oral QID PRN  
 levothyroxine (SYNTHROID) tablet 50 mcg  50 mcg Oral ACB Lab Review:  
 
Recent Labs  
  02/24/21 
0436 02/23/21 
0036 02/22/21 
8474 WBC 11.6* 9.0 9.6 HGB 12.3 11.1* 11.9*  
HCT 34.9* 31.8* 34.4*  
 247 245 Recent Labs  
  02/24/21 
0436 02/23/21 
0036 02/22/21 
7116  143 143  
K 3.3* 3.0* 3.2*  
* 113* 111* CO2 24 22 24 GLU 86 181* 70 BUN 30* 32* 29* CREA 1.42* 1.57* 1.51* CA 7. 8* 8.1* 8.1* ALB 1.9* 1.7* 1.8* ALT 19 24 29 No components found for: [address] [facility]

## 2021-02-24 NOTE — PROGRESS NOTES
2/24/2021 
2:29 PM 
Medicare pt's wife Tawana Pleitez has received, and  reviewed,  2nd IM letter verbal via phone, informing them of their right to appeal the discharge. Copy  has been placed on pt bedside chart. KASIE Tamayo

## 2021-02-24 NOTE — PROGRESS NOTES
Due to Contact Restrictions did not enter the room. Provided prayer at [facility] 5 patient's doorway. Did not include sacramental care. Bereket Coombs

## 2021-02-24 NOTE — PROGRESS NOTES
0730- Bedside and Verbal shift change report given to Dayanara Choi RN (oncoming nurse) by [address], RN (offgoing nurse). Report included the following information SBAR, Kardex, ED Summary, OR Summary, Procedure Summary, Intake/Output, MAR, Accordion, and Recent Results. 0900- Patient has not voided. Patient stated he feels like he could void but unable to. Bladder scan shows greater than 280 ml. Dr. Jewel Ding notified. Order received for 500 ml bolus x1.  
 
1150- 500 ml bolus completed at 1130. Bladder scan 440ml. Patient has not voided. This patient was assisted with Intentional Toileting every 2 hours during this shift. Documentation of ambulation and output reflected on Flowsheet. 1930-Bedside and Verbal shift change report given to RN (oncoming nurse) by Dayanara Choi RN(offgoing nurse). Report included the following information SBAR, Kardex, ED Summary, OR Summary, Procedure Summary, Intake/Output, MAR, Accordion, Recent Results, and Med Rec Status.

## 2021-02-25 NOTE — ROUTINE PROCESS
SPEECH PATHOLOGY BEDSIDE SWALLOW EVALUATION/DISCHARGE Patient: Sosa Brasher (80 y.o. male) Date: 2/25/2021 Primary Diagnosis: Severe sepsis ([facility] [address] [A41.9, R65.20] Lactic acidosis [E87.2] Acute cholecystitis [K81.0] RUQ pain [R10.11] Acute epigastric pain [R10.13] Abdominal pain, acute, right upper quadrant [R10.11] Hyperbilirubinemia [E80.6] Hypokalemia [E87.6] Leukocytosis [D72.829] Atrial fibrillation ([facility] [address] [I48.91] GIB (gastrointestinal bleeding) [K92.2] Acute metabolic encephalopathy [A24.23] Precautions: aspiration ASSESSMENT : 
Based on the objective data described below, the patient presents with functional swallow. He had an instance of choking last night on water. He also is COVID positive. Patient reports minimal appetite. Admitted 2-18-21 with AMS, vomiting, abd pain. Dx: cholecystitis. Had cholecystecomy 2-19-21. CXR: negative. PMH: lives in (A) living, w/c bound, HTN, HLD. . 
Skilled acute therapy provided by a speech-language pathologist is not indicated at this time. PLAN : 
Recommendations: 
Ok to return to GI lite diet. Discharge Recommendations: None SUBJECTIVE:  
Patient stated This sickness has just taken my appetite away. OBJECTIVE:  
 
Past Medical History:  
Diagnosis Date  Contact dermatitis and other eczema, due to unspecified cause  Hypercholesterolemia  Hypertension Past Surgical History:  
Procedure Laterality Date  HX APPENDECTOMY  HX PROSTATECTOMY  1986  
 for BPH  
 HX UROLOGICAL    
 IR CHOLECYSTOSTOMY PERCUTANEOUS SI  2/19/2021 Prior Level of Function/Home Situation: SUE Diet prior to admission: regular, thins? Current Diet:  NPO b/c of coughing on water last night. Cognitive and Communication Status: 
Neurologic State: Alert Orientation Level: Oriented to person, Oriented to place Cognition: (followed commands. can communicate wants and needs) Perception: Appears intact Perseveration: No perseveration noted Safety/Judgement: Decreased insight into deficits Oral Assessment: 
Oral Assessment Labial: No impairment Dentition: Natural 
Oral Hygiene: tongue mildly dry and sticky Lingual: Right deviation(minimal) Velum: No impairment Mandible: No impairment P.O. Trials: 
Patient Position: upright in bed Vocal quality prior to P.O.: No impairment Consistency Presented: Mechanical soft; Thin liquid How Presented: SLP-fed/presented;Straw;Spoon(difficutly feeding self) ORAL PHASE:  
Bolus Acceptance: No impairment Bolus Formation/Control: (mildly extended chew) Propulsion: No impairment Oral Residue: None PHARYNGEAL PHASE:  
Initiation of Swallow: No impairment Laryngeal Elevation: Functional 
Aspiration Signs/Symptoms: None Pharyngeal Phase Characteristics: No impairment, issues, or problems Effective Modifications: None MINIMAL interest in PO-c/o no appetite. NOMS:  
The NOMS functional outcome measure was used to quantify this patient's level of swallowing impairment. Based on the NOMS, the patient was determined to be at level 7 for swallow function NOMS Swallowing Levels: 
Level 1 (CN): NPO Level 2 (CM): NPO but takes consistency in therapy Level 3 (CL): Takes less than 50% of nutrition p.o. and continues with nonoral feedings; and/or safe with mod cues; and/or max diet restriction Level 4 (CK): Safe swallow but needs mod cues; and/or mod diet restriction; and/or still requires some nonoral feeding/supplements Level 5 (CJ): Safe swallow with min diet restriction; and/or needs min cues Level 6 (CI): Independent with p.o.; rare cues; usually self cues; may need to avoid some foods or needs extra time Level 7 (CH): Independent for all p.o. CHET. (2003). National Outcomes Measurement System (NOMS): Adult Speech-Language Pathology User's Guide. Pain: 
Pain Scale 1: Numeric (0 - 10) Pain Intensity 1: 0 After treatment: Patient left in no apparent distress in bed and Nursing notified COMMUNICATION/EDUCATION:  
Patient was educated regarding his deficit(s) of dysphagia  as this relates to his diagnosis of COVID, HTN, cholecystitis. He demonstrated Good understanding as evidenced by discussion. The patient's plan of care including recommendations, planned interventions, and recommended diet changes were discussed with: Registered nurse. Thank you for this referral. 
WESTON Zavaleta Time Calculation: 15 mins

## 2021-02-25 NOTE — PROGRESS NOTES
Shift Change: 
 
Bedside and Verbal shift change report given to Chip RN (oncoming nurse) by Elena Wilson RN (offgoing nurse). Report included the following information SBAR, Kardex, Intake/Output, MAR, Recent Results and Cardiac Rhythm NSR.  
 
 
0836: Meds given crushed in applesauce. No swallowing difficulty noted. 0900: Spoke with CM regarding patient d/c. Waiting response from [facility] then will set up [facility] for transport. 1330: Attempted to call report x1. Will try again prior to transport. 1350: Patient ready for d/c. Waiting [facility] arrival for transport 1407: Report given to receiving nurse Ana  at [facility]. Notified of ernst catheter and biliary drain. Aware COVID (+) but likely not contagious. 1445: Report given to [facility] transport. Patient stable at time of transport. 1450: [facility] in room to transport patient. Patient is refusing to go to [facility] rehab. Stated \"No I am not going the treatment is horrible. \" patient very agitated and upset in regards to transfer. Patient refusing to sign refusing transport. Attempted to speak with patient about a new plan for d/c and he started yelling. Patient refusing to speak on phone with daughter and wife. Patient is A&Ox4 and able to make own decisions. Spoke with Dr. Babs Anderson: 
 
Verbal report given to Harjeet Snow RN(name) on [facility]  being transferred to 4th floor(unit) for routine progression of care Report consisted of patients Situation, Background, Assessment and  
Recommendations(SBAR). Information from the following report(s) SBAR, Kardex, Intake/Output, MAR, Recent Results and Cardiac Rhythm NSR was reviewed with the receiving nurse. Lines:    
 
Opportunity for questions and clarification was provided. Patient transported with: 
 Registered Nurse Tech

## 2021-02-25 NOTE — PROGRESS NOTES
Patient to be discharged today, not seen. Follow up appt scheduled for 3/3/21 at our [facility] location with Dr. Rosalio Hyatt. Information added to dc instructions.   
 
Roger Alvarez NP

## 2021-02-25 NOTE — DISCHARGE INSTRUCTIONS
Discharge Instructions       PATIENT ID: Mariam Mercado  MRN: [id number]   YOB: 1930    DATE OF ADMISSION: 2/18/2021  1:50 PM    DATE OF DISCHARGE: 2/25/2021    PRIMARY CARE PROVIDER: Sierra Jack MD     ATTENDING PHYSICIAN: Remi Samuels MD  DISCHARGING PROVIDER: Noemy Mcbride MD        DISCHARGE DIAGNOSES   1. Sepsis  2. Acute cholecystitis  3. Atrial fibrillation with RVR  4. HAMLET  5. Urinary retention  6. COVID-19 infection  7. Hypokalemia  8. Elevated INR  9. Hypothyroidism    CONSULTATIONS: IP CONSULT TO GENERAL SURGERY  IP CONSULT TO GASTROENTEROLOGY  IP CONSULT TO UROLOGY    PROCEDURES/SURGERIES: * No surgery found *    PENDING TEST RESULTS:   At the time of discharge the following test results are still pending: None    FOLLOW UP APPOINTMENTS:   Follow-up Information     Follow up With Specialties Details Why Contact Info    Sierra Jack MD Family Medicine In 1 week Hospital discharge follow up [facility] [phone]      Silvio Gonzalez MD General Surgery In 2 weeks Follow up for acute cholecystitic, drain evaluation [address] [facility]  [address] [phone] [facility]  In 1 week Follow up for ernst, voiding trial [address]  [address] [facility]:   Continue antibiotics until 3/3. Follow-up with surgery to remove drain and schedule cholecystectomy. Follow-up with urology for voiding trial and Ernst removal.  DIET: GI lite    ACTIVITY: As tolerated      DISCHARGE MEDICATIONS:   See Medication Reconciliation Form    · It is important that you take the medication exactly as they are prescribed. · Keep your medication in the bottles provided by the pharmacist and keep a list of the medication names, dosages, and times to be taken in your wallet. · Do not take other medications without consulting your doctor.        NOTIFY YOUR PHYSICIAN FOR ANY OF THE FOLLOWING:   Fever over 101 degrees for 24 hours. Chest pain, shortness of breath, fever, chills, nausea, vomiting, diarrhea, change in mentation, falling, weakness, bleeding. Severe pain or pain not relieved by medications. Or, any other signs or symptoms that you may have questions about.       DISPOSITION:    Home With:   OT  PT  HH  RN      x SNF/Inpatient Rehab/LTAC   x Independent/assisted living    Hospice    Other:     CDMP Checked:   Yes ***     PROBLEM LIST Updated:  Yes ***       Signed:   Louis Andersen MD  2/25/2021  7:32 AM

## 2021-02-25 NOTE — PROGRESS NOTES
2330: pt lost IV access. RN, PACU RN, ICU RN all unsuccessful. Notified Dr. Rima Barriga. Ok to go without IV access until morning. dayteam can assess need for line. 0100: ED RN on floor and was able to get IV access 630: pt coughing with swallowing. Notified Dr. Rima Barriga orders to make NPO and consult speech. 700: Bedside shift change report given to MALKA Saunders (oncoming nurse) by Lucinda Olsen RN (offgoing nurse). Report included the following information SBAR, Kardex, Intake/Output, Recent Results and Cardiac Rhythm NSR.

## 2021-02-25 NOTE — PROGRESS NOTES
[address] visit. Mr. Sarah Hendrickson is unable to receive communion due to medical restrictions. Prayer for spiritual communion offered outside his room. JON Pantoja, RN, ACSW, LCSW  Page:  [phone])

## 2021-02-25 NOTE — PROGRESS NOTES
2/25/2021  
11:19 AM 
CM spoke w/ Dtr Uzair Marshall, pt was previously at [facility] for rehab, referral sent in [employer] Await response Darryle Paterson, BS  9:32 AM 
CM sent referral to [facility] for placement as [facility] not likely to accept back w/ current level of care, await response Darryle Paterson, BS  9:11 AM 
CM discussed pt w/ Dr Enoch Mac, medically stable for DC, CM called [facility] where pt resides, faxed Discharge Summary to [phone] for review by staff, await response, pt is on will call for [facility] Will follow. Darryle Paterson, BS

## 2021-02-25 NOTE — PROGRESS NOTES
2/25/2021 5:23 PM 
Pt refused DC to [facility] when transport arrived, CM spoke w/ Dtr Carmenza Mock, to confirm DC plan to SNF, however pt continued to refuse and transport left. Family wants pt to return to [facility]; CM spoke w/ [facility] and  at [facility], he confirmed pt could return if [facility] SN was arranged daily through [facility], [facility] will not allow pt's wife to perform drain care. Renate gave verbal consent for referral to [facility], they have accepted pt will be able to provide SN 3-4 days/wk depending on staffing, family is agreeable to private pay SN for other days. [address] [place] [address] [facility] liaison is confirming additional services in AM. CM placed pt on will call for stretcher/[facility] for 2/26/21. Updated floor CM Ashanti Jimenez Will follow and assist. 
KASIE English  12:53 PM 
Transition of Care Plan to SNF/Rehab 
 
SNF/Rehab Transition: 
Patient has been accepted to [facility] and meets criteria for admission. Patient will transported by [facility] stretcher and expected to leave at 2:00PM 
 
Communication to Patient/Family: Met with patient and daughter Carmenza Mock and they are agreeable to the transition plan. Communication to SNF/Rehab: 
Bedside RN, Ashley, has been notified to update the transition plan to the facility and call report (phone number [phone]  Irma Christian [facility] Unit Discharge information has been updated on the AVS. Discharge instructions to be fax'd to facility at [facility] # Patient has been identified as part of the [employer].  For Care Coordination associated with that Bundle Program, please contact Bundle information has been communication to  
 
Nursing Please include all hard scripts for controlled substances, med rec and dc summary, and AVS in packet. Reviewed and confirmed with facility, Tanya Jackson  can manage the patient care needs for the following: SNF/Rehab Transition: Patient to follow-up with Home Health: [facility], other  ,none) PCP/Specialist: Mu Ag MD 
 
Reviewed and confirmed with facility, [facility] can manage the patient care needs for the following:  
 
Tra Pearson with (X) only those applicable: 
 
Medication: 
[x]  Medications will be available at the facility []  IV Antibiotics []  Controlled Substance - hard copy to be sent with patient  
[]  Weekly Labs Documents: 
[] Hard RX [x] MAR 
[] Kardex [x] AVS 
[]Transfer Summary 
[x]Discharge Equipment: 
[]  CPAP/BiPAP []  Wound Vacuum [x]  Singh or Urinary Device 
[]  PICC/Central Line 
[]  Nebulizer 
[]  Ventilator Treatment: 
[]Isolation (for MRSA, VRE, etc.) [x]Surgical Drain Management []Tracheostomy Care 
[]Dressing Changes []Dialysis with transportation and chair time []PEG Care []Oxygen []Daily Weights for Heart Failure Dietary: 
[x]Any diet limitations []Tube Feedings []Total Parenteral Management (TPN) Eligible for Medicaid Long Term Services and Supports Yes: 
[] Eligible for medical assistance or will become eligible within 180 days and UAI completed. [] Provider/Patient and/or support system has requested screening. [] UAI copy provided to patient or responsible party,  
[] UAI unavailable at discharge will send once processed to SNF provider. [] UAI unavailable at discharged mailed to patient No:  
[x] Private pay and is not financially eligible for Medicaid within the next 180 days. [] Reside out-of-state. [] A residents of a state owned/operated facility that is licensed 
by [employer] and [employer] or [facility] 
[] Enrollment in [facility] hospice services 
[] [address] [place] [address] [address] 
[] Patient /Family declines to have screening completed or provide financial information for screening Financial Resources: 
Medicaid   
[] Initiated and application pending  
[] Full coverage Advanced Care Plan: []Surrogate Decision Maker of Care  
[x]Communicated Code Status DNR Other  Transport packet to bedside chart Financial Resources: 
Medicaid   
[] Initiated and application pending  
[] Full coverage Advanced Care Plan: 
[]Surrogate Decision Maker of Care 
[]POA []Communicated Code Status DNR Other Care Management Interventions PCP Verified by CM: Yes(NP sees pt in [place]) Mode of Transport at Discharge: BLS Transition of Care Consult (CM Consult): SNF Partner SNF: No 
Reason Why Partner SNF Not Chosen: Friend/family recommendation Discharge Durable Medical Equipment: No 
Physical Therapy Consult: No 
Occupational Therapy Consult: No 
Current Support Network: Assisted Living(Lives with spouse in [address] [place] [address]) Confirm Follow Up Transport: Other (see comment)(Needs stretcher transport) The Plan for Transition of Care is Related to the Following Treatment Goals : SNF Rehab The Patient and/or Patient Representative was Provided with a Choice of Provider and Agrees with the Discharge Plan?: Yes Name of the Patient Representative Who was Provided with a Choice of Provider and Agrees with the Discharge Plan: David Snow [place] of Choice List was Provided with Basic Dialogue that Supports the Patient's Individualized Plan of Care/Goals, Treatment Preferences and Shares the Quality Data Associated with the Providers?: (S) Yes Discharge Location Discharge Placement: Skilled nursing facility [facility]

## 2021-02-25 NOTE — DISCHARGE SUMMARY
Discharge Summary PATIENT ID: Royce Morales MRN: [id number] YOB: 1930 DATE OF ADMISSION: 2/18/2021  1:50 PM   
DATE OF DISCHARGE: 02/25/21 PRIMARY CARE PROVIDER: Rui Lin MD  
 
DISCHARGING PROVIDER: Partha Brunson MD   
 
CONSULTATIONS: IP CONSULT TO GENERAL SURGERY 
IP CONSULT TO GASTROENTEROLOGY 
IP CONSULT TO UROLOGY PROCEDURES/SURGERIES: * No surgery found * [address] COURSE: #Sepsis: Resolved. Most likely source is gallbladder; however, also with mildly abnl UA. WBC has improved as has mentation.  
            - Appreciate Surg recs - Perc pérez 2/19, plan to dc with drain and f/u with surgery in 2 weeks to schedule lap pérez - Pip/tazo 2/18 - 21 
                        - Changed to IV levo/flagyl 2/21, cont until 3/3         
                        - UCx, BCx, bile cx NGTD 
  
#Cholecystitis: Now s/p perc pérez. Plan dc with drain, f./u surg in 2 weeks to schedule lap pérez in 4 weeks. Advance diet.  
  
#AFwRVR: Further hx review suggests pAF. INR elevated on arrival, but this was most likely e/o coagulopathy in s/o above as pt does not take warfarin. Required amio gtt initially, but HR has now improved with treatment of sepsis  
            - d/c amio - Metop 25mg bid as outpt, continued 
  #HAMLET: FENa pre-renal. No hx CHF, does not appear volume overloaded; doubt cardiorenal.  
            - Gentle fluids again today 
  
#Urinary Retention: > 500cc pvr. Known BPH. Potential source of infection. Doubt obstructive hamlet 
            - failed void trial, reinserted ernst, urology evaluated, dc with ernst, void trial outpatient 
  
#SARS-CoV-2: COVID PCR returned positive, rapid was negative. As PCR is much more sensitive than Ag testing, he likely has viral particles remaining, but is not likely contagious.  Regardless, implement droplet plus precautions out of an abundance of caution.  
  
#Hypokalemia: Repleted 
  #Elevated INR: Favor DIC/Coagulopathy in s/o above. No utility in reversing 
  #Hypothyroidism: 
            - Cont home LT4 
 
 
 
DISCHARGE DIAGNOSES / PLAN:   
 
1. Sepsis 2. Acute cholecystitis 3. Atrial fibrillation with RVR 4. HAMLET 5. Urinary retention 6. COVID-19 infection 7. Hypokalemia 8. Elevated INR 9. Hypothyroidism ADDITIONAL CARE RECOMMENDATIONS:  
Continue antibiotics until 3/3. Follow-up with surgery to remove drain and schedule cholecystectomy. Follow-up with urology for voiding trial and Ernst removal. 
 
PENDING TEST RESULTS:  
At the time of discharge the following test results are still pending: None FOLLOW UP APPOINTMENTS:   
Follow-up Information Follow up With Specialties Details Why Contact Info Marni Bernard MD Family Medicine In 1 week Hospital discharge follow up [facility] [address] [address] [phone] Kishore Stephens MD General Surgery In 2 weeks Follow up for acute cholecystitic, drain evaluation [id number] [place] [address] [address] [facility] 
[phone] [facility]  In 1 week Follow up for ernst, voiding trial [address] [place], [place] [id number] DIET: GI lite ACTIVITY: As tolerated DISCHARGE MEDICATIONS: 
Current Discharge Medication List  
  
START taking these medications Details  
levoFLOXacin (LEVAQUIN) 750 mg tablet Take 1 Tab by mouth every fourty-eight (48) hours for 6 days. Qty: 3 Tab, Refills: 0  
  
metroNIDAZOLE (FLAGYL) 500 mg tablet Take 1 Tab by mouth three (3) times daily for 6 days. Qty: 18 Tab, Refills: 0  
  
oxyCODONE IR (ROXICODONE) 5 mg immediate release tablet Take 1 Tab by mouth every four (4) hours as needed for Pain for up to 3 days. Max Daily Amount: 30 mg. 
Qty: 10 Tab, Refills: 0 Associated Diagnoses: Acute cholecystitis  
  
tamsulosin (FLOMAX) 0.4 mg capsule Take 1 Cap by mouth daily. Qty: 30 Cap, Refills: 0 CONTINUE these medications which have NOT CHANGED Details  
metoprolol tartrate (LOPRESSOR) 25 mg tablet Take 25 mg by mouth two (2) times a day. simvastatin (ZOCOR) 20 mg tablet Take 20 mg by mouth nightly. levothyroxine (SYNTHROID) 50 mcg tablet Take 50 mcg by mouth Daily (before breakfast). terazosin (HYTRIN) 10 mg capsule Take 10 mg by mouth two (2) times a day. aspirin delayed-release 81 mg tablet Take 81 mg by mouth nightly. loperamide (IMODIUM) 2 mg capsule Take 2 mg by mouth every six (6) hours as needed for Diarrhea.  
  
escitalopram oxalate (LEXAPRO) 20 mg tablet Take 20 mg by mouth daily. allopurinol (ZYLOPRIM) 100 mg tablet TAKE 1 TABLET BY MOUTH TWICE A DAY TO PREVENT GOUT 
Qty: 180 Tab, Refills: 2 STOP taking these medications  
  
 furosemide (LASIX) 20 mg tablet Comments:  
Reason for Stopping:   
   
  
 
 
 
NOTIFY YOUR PHYSICIAN FOR ANY OF THE FOLLOWING:  
Fever over 101 degrees for 24 hours. Chest pain, shortness of breath, fever, chills, nausea, vomiting, diarrhea, change in mentation, falling, weakness, bleeding. Severe pain or pain not relieved by medications. Or, any other signs or symptoms that you may have questions about. DISPOSITION: 
  Home With: 
 OT  PT  HH  RN  
  
x Long term SNF/Inpatient Rehab  
x Independent/assisted living Hospice Other:  
 
 
PATIENT CONDITION AT DISCHARGE:  
 
Functional status  
x Poor Deconditioned Independent Cognition Lucid   
x Forgetful Dementia Catheters/lines (plus indication) x Singh, GB drain PICC   
 PEG Code status Full code   
x DNR PHYSICAL EXAMINATION AT DISCHARGE: 
General:          Alert, cooperative, no distress, appears stated age. HEENT:           Atraumatic, anicteric sclerae, pink conjunctivae No oral ulcers, mucosa moist, throat clear, dentition fair Neck: Supple, symmetrical 
Lungs:             Clear to auscultation bilaterally. No Wheezing or Rhonchi. No rales. Heart:              Regular  rhythm,  No  murmur   No edema Abdomen:        Soft, non-tender. Not distended. Bowel sounds normal 
Extremities:     No cyanosis. No clubbing,   
                        Skin turgor normal, Capillary refill normal 
Skin:                Not pale. Not Jaundiced  No rashes Psych:             Not anxious or agitated. Neurologic:      Alert, moves all extremities, answers questions appropriately and responds to commands CHRONIC MEDICAL DIAGNOSES: 
Problem List as of 2/25/2021 Date Reviewed: 10/10/2013 Codes Class Noted - Resolved Atrial fibrillation [facility]) ICD-10-CM: I48.91 
ICD-9-CM: 427.31  2/18/2021 - Present Acute cholecystitis ICD-10-CM: K81.0 ICD-9-CM: 575.0  2/18/2021 - Present Hypokalemia ICD-10-CM: E87.6 ICD-9-CM: 276.8  2/18/2021 - Present Lactic acidosis ICD-10-CM: E87.2 ICD-9-CM: 276.2  2/18/2021 - Present Hyperbilirubinemia ICD-10-CM: E80.6 ICD-9-CM: 782.4  2/18/2021 - Present Leukocytosis ICD-10-CM: M63.084 ICD-9-CM: 288.60  2/18/2021 - Present GIB (gastrointestinal bleeding) ICD-10-CM: K92.2 ICD-9-CM: 578.9  2/18/2021 - Present RUQ pain ICD-10-CM: R10.11 ICD-9-CM: 789.01  2/18/2021 - Present Severe sepsis (HCC) ICD-10-CM: A41.9, R65.20 ICD-9-CM: 038.9, 995.92  2/18/2021 - Present Acute epigastric pain ICD-10-CM: R10.13 ICD-9-CM: 789.06, 338.19  2/18/2021 - Present Abdominal pain, acute, right upper quadrant ICD-10-CM: R10.11 ICD-9-CM: 789.01, 338.19  2/18/2021 - Present Acute metabolic encephalopathy ACP-39-RN: G93.41 
ICD-9-CM: 348.31  2/18/2021 - Present Hematuria ICD-10-CM: R31.9 ICD-9-CM: 599.70  11/14/2013 - Present Overview Signed 11/14/2013  2:17 PM by Moraima Kathleen While on xaralto Should see urology but patient declined DVT (deep venous thrombosis) (HCC) ICD-10-CM: I82.409 ICD-9-CM: 453.40  10/17/2013 - Present Overview Addendum 1/20/2014  1:23 PM by Luis A Conway Left distal lower extremity DVT involving posterior tibial and peroneal veins, symptomatic. No transient risk factors identified. He does have decreased mobility in the leg due to his polio, though this is actually his \"good\" leg. He is currently on coumadin, with therapeutic INR, tolerating therapy without difficulty. He should be treated with at least 3 months of anticoagulation. The current ACCP guidelines actually recommend against longer duration for patient's with a distal DVT, based on a paucity of data. I would like to reevaluate him at the three month dottie, along with a D-dimer, to make a final decision regarding duration, but my inclination based on these guidelines and his history is to stop at that point. Should he develop another unprovoked event in the future, even if it is distal, long-term anticoagulation would then be advised. Dr. Keen Grate: 
1) DVT, unprovoked Left distal lower extremity DVT involving posterior tibial and peroneal veins, symptomatic. No transient risk factors identified. He does have decreased mobility in the leg due to his polio, though this is actually his \"good\" leg. He is currently on coumadin, with therapeutic INR, tolerating therapy without difficulty. His D-dimer remains elevated, which suggests an increased risk of recurrence. In addition, the event was unprovoked, he has chronic mobility problems due to his polio, and he has symptoms suggestive of residual chronic clot burden, all of which increase his risk of recurrence. Typically for a distal event the guidelines recommend 3 months of anticoagulation, but in light of the above I worry about his high risk of recurrence and I have recommended that he remain on anticoagulation for now. I will reevaluate him at the 1 year dottie, with a repeat D-dimer.   
He should continue coumadin with goal INR 2-3, managed by his PCP. Microalbuminuria ICD-10-CM: R80.9 ICD-9-CM: 791.0  7/19/2012 - Present Overview Signed 7/19/2012 11:21 AM by Jordin Short Needs 24 hour urine Abnormal EKG ICD-10-CM: R94.31 
ICD-9-CM: 794.31  7/12/2012 - Present Overview Addendum 7/12/2012  3:01 PM by Jordin Short Afib, non-specific changes noted 9/26/2008 EKG 7/12/12 shows sinus bradycardia with  Qwaves III, aVF S/p nephrectomy ICD-10-CM: Z90.5 ICD-9-CM: V45.73  12/13/2011 - Present Overview Signed 12/13/2011  1:58 PM by Jordin Short Right nephrectomy age 13 S/P appy ICD-10-CM: Z90.49 ICD-9-CM: V45.89  12/13/2011 - Present Overview Signed 12/13/2011  1:58 PM by Brittany Ohara, [address] SBO (small bowel obstruction) ([address] ICD-10-CM: K35.908 ICD-9-CM: 560.9  11/16/2010 - Present Overview Signed 11/16/2010 12:08 PM by Jordin Roa ICD-10-CM: A80.9 ICD-9-CM: 045.90  5/10/2010 - Present Overview Addendum 7/12/2012  2:32 PM by Jordin Short  
  bilateral braces on legs Virtually wheelchair bound 2012 Declines PMR and PT evaluation Colon polyps ICD-10-CM: K63.5 ICD-9-CM: 211.3  5/10/2010 - Present Overview Signed 12/13/2011  1:57 PM by Jordin Short  
  2008>>>next 2013 Dr. Gogo Leon HTN (hypertension) ICD-10-CM: I10 
ICD-9-CM: 401.9  5/10/2010 - Present Overview Addendum 3/20/2013  2:09 PM by Jordin Short  
  ECHO 2008 EF 55% ECHO 60206: The ventricle was mildly dilated. Systolic function was 
normal by visual assessment. Ejection fraction was estimated to be 60 %. There were no regional wall motion abnormalities. There was mild 
asymmetric hypertrophy involving focal basal segments. Lexiscan normal 2012 High cholesterol ICD-10-CM: E78.00 ICD-9-CM: 272.0  5/10/2010 - Present  Basal cell cancer ICD-10-CM: C44.91 
ICD-9-CM: 173.91  5/10/2010 - Present Overview Signed 12/13/2011  1:56 PM by Mariluz Hilton ICD-10-CM: L71.9 ICD-9-CM: 695.3  5/10/2010 - Present Hyperglycemia ICD-10-CM: R73.9 ICD-9-CM: 790.29  5/10/2010 - Present Overview Signed 3/20/2013  2:11 PM by Mariluz Lomeli  
  2 hour PP glu 185 2010 
a1c 5.3 2012 Greater than 25 minutes were spent with the patient on counseling and coordination of care Signed:  
Isauro Espana MD 
2/25/2021 
7:24 AM

## 2021-02-26 NOTE — PROGRESS NOTES
02/26/21 3:02 PM 
CM spoke with daughter, Karen Steiner ([phone]) and patient's wife Carlyle Marquez. Per Karen Steiner, they have made the decision to move her parents out of [facility]. [facility] is unable to provide respite. Due to this, she and her mother have spoken with the patient and the plan is now for the patient to transition to [facility]. Karen Steiner is [address] as a permanent housing option, but this will not be settled until next week. CM contacted Davis Nelson at [place]. They can accept patient today. [facility] transport arranged for 6:45PM; spoke with Jose Peterson at [facility] and confirmed destination for [facility]. Patient, family, and [facility] updated on this. Ambulance packet on chart with completed PCS. Of note, patient is to have surgery in about a month; this has been documented in attending's note.  
Maile Severs, MSW

## 2021-02-26 NOTE — PROGRESS NOTES
Josiah Bridget [facility] [address] 
[address], [place], [address] [facility] 
[phone] Medical Progress Note NAME: Leah Sheikh :  1/10/1930 MRM:  [id number] Date/Time: 2021 2:40 PM 
 
 
  
Assessment / Plan: #Sepsis: Resolved. Most likely source is gallbladder; however, also with mildly abnl UA. WBC has improved as has mentation.  
            - Appreciate Surg recs                         - Perc pérez , plan to dc with drain and f/u with surgery in 2 weeks to schedule lap pérez 
                        - Pip/tazo  -  
                        - Changed to IV levo/flagyl , cont until 3/3         
                        - UCx, BCx, bile cx NGTD 
  
#Cholecystitis: Now s/p perc pérez. Plan dc with drain, f./u surg in 2 weeks to schedule lap pérez in 4 weeks. Advance diet.  
  
#AFwRVR: Further hx review suggests pAF. INR elevated on arrival, but this was most likely e/o coagulopathy in s/o above as pt does not take warfarin. Required amio gtt initially, but HR has now improved with treatment of sepsis             - d/c amio 
            - Metop 25mg bid as outpt, continued 
  #HAMLET: FENa pre-renal. No hx CHF, does not appear volume overloaded; doubt cardiorenal.  
            - Gentle fluids again today 
  
#Urinary Retention: > 500cc pvr. Known BPH. Potential source of infection. Doubt obstructive hamlet 
            - failed void trial, reinserted ernst, urology evaluated, dc with ernst, void trial outpatient 
  
#SARS-CoV-2: COVID PCR returned positive, rapid was negative. As PCR is much more sensitive than Ag testing, he likely has viral particles remaining, but is not likely contagious. Regardless, implement droplet plus precautions out of an abundance of caution.  
  
#Hypokalemia: Repleted 
  #Elevated INR: Favor DIC/Coagulopathy in s/o above. No utility in reversing 
  #Hypothyroidism: 
            - Cont home LT4 Total time spent with patient: 30 Minutes Care Plan discussed with: Patient and Family Discussed:  Code Status and Care Plan Prophylaxis:  Lovenox Disposition:  SNF, plan to discharge today to [place] 
        
___________________________________________________ Attending Physician: Tab Sheth MD  
 
  
Subjective: Chief Complaint:  He has no complaints and wants to go home Objective:  
 
 
Vitals:  
 
 
  
Last 24hrs VS reviewed since prior progress note. Most recent are: 
 
Visit Vitals BP (!) 145/83 (BP 1 Location: Left arm, BP Patient Position: At rest) Pulse 76 Temp 97.7 °F (36.5 °C) Resp 16 Ht 5' 4\" (1.626 m) Wt 83.5 kg (184 lb) SpO2 95% BMI 31.58 kg/m² SpO2 Readings from Last 6 Encounters:  
02/26/21 95% 01/16/14 97% 11/14/13 96% 10/18/13 97% 10/17/13 95% 10/14/13 99% Intake/Output Summary (Last 24 hours) at 2/26/2021 1520 Last data filed at 2/26/2021 5386 Gross per 24 hour Intake 40 ml Output 775 ml Net -735 ml Exam:  
 
Physical Exam: 
 
Gen:  Well-developed, well-nourished, in no acute distress HEENT:  MMM Neck:  Supple, without masses, thyroid non-tender Resp:  No accessory muscle use, clear breath sounds without wheezes rales or rhonchi 
Card:  No murmurs, normal S1, S2 without thrills, bruits or peripheral edema Abd:  Soft, Non tender, no rebound or guarding Musc:  No cyanosis or clubbing Skin:  No rashes or ulcers, skin turgor is good Neuro:  Cranial nerves 3-12 are grossly intact,  strength is 5/5 bilaterally and dorsi / plantarflexion is 5/5 bilaterally, follows commands appropriately Psych:  Good insight, oriented x 3 and context Telemetry reviewed:   AFIB Medications Reviewed: (see below) Lab Data Reviewed: (see below) 
 
______________________________________________________________________ Medications:  
 
Current Facility-Administered Medications Medication Dose Route Frequency  pantoprazole (PROTONIX) tablet 40 mg  40 mg Oral ACB  
 0.45% sodium chloride infusion  75 mL/hr IntraVENous CONTINUOUS  
 tamsulosin (FLOMAX) capsule 0.4 mg  0.4 mg Oral DAILY  levoFLOXacin (LEVAQUIN) tablet 750 mg  750 mg Oral Q48H  
 metroNIDAZOLE (FLAGYL) tablet 500 mg  500 mg Oral TID  metoprolol tartrate (LOPRESSOR) tablet 50 mg  50 mg Oral BID  enoxaparin (LOVENOX) injection 40 mg  40 mg SubCUTAneous Q24H  
 sodium chloride (NS) flush 5-40 mL  5-40 mL IntraVENous Q8H  
 sodium chloride (NS) flush 5-40 mL  5-40 mL IntraVENous PRN  
 acetaminophen (TYLENOL) tablet 650 mg  650 mg Oral Q6H PRN Or  
 acetaminophen (TYLENOL) suppository 650 mg  650 mg Rectal Q6H PRN  polyethylene glycol (MIRALAX) packet 17 g  17 g Oral DAILY PRN  
 bisacodyL (DULCOLAX) suppository 10 mg  10 mg Rectal DAILY PRN  
 ondansetron (ZOFRAN) injection 4 mg  4 mg IntraVENous Q6H PRN  
 labetaloL (NORMODYNE;TRANDATE) 20 mg/4 mL (5 mg/mL) injection 20 mg  20 mg IntraVENous Q4H PRN  
 hydrALAZINE (APRESOLINE) 20 mg/mL injection 20 mg  20 mg IntraVENous Q4H PRN  
 melatonin (rapid dissolve) tablet 5 mg  5 mg Oral QHS PRN  
 alum-mag hydroxide-simeth (MYLANTA) oral suspension 30 mL  30 mL Oral Q4H PRN  
 LORazepam (ATIVAN) injection 0.5 mg  0.5 mg IntraVENous Q6H PRN  
 oxyCODONE IR (ROXICODONE) tablet 5 mg  5 mg Oral Q4H PRN  
 oxyCODONE IR (ROXICODONE) tablet 10 mg  10 mg Oral Q4H PRN  
 morphine injection 2 mg  2 mg IntraVENous Q4H PRN  
 diphenhydrAMINE (BENADRYL) injection 25 mg  25 mg IntraVENous Q6H PRN  
 diphenhydrAMINE (BENADRYL) capsule 25 mg  25 mg Oral Q6H PRN  
 albuterol (PROVENTIL VENTOLIN) nebulizer solution 2.5 mg  2.5 mg Nebulization Q4H PRN  
 simethicone (MYLICON) tablet 80 mg  80 mg Oral QID PRN  
 levothyroxine (SYNTHROID) tablet 50 mcg  50 mcg Oral ACB Lab Review:  
 
Recent Labs  
  02/25/21 
0119 02/24/21 
0436 WBC 13.8* 11.6* HGB 12.1 12.3 HCT 34.1* 34.9*  
 234  
 
Recent Labs  
  02/25/21 
0119 02/24/21 
0436  
 145  
K 3.2* 3.3*  
* 113*  
CO2 23 24  
GLU 98 86  
BUN 25* 30*  
CREA 1.21 1.42*  
CA 7.8* 7.8*  
ALB 1.9* 1.9*  
ALT 17 19  
 
No components found for: GLPOC

## 2021-02-26 NOTE — PROGRESS NOTES
2/26/2021  
12:46 PM 
CM updated Concepcion Quinteros and Elayne Parish on plan for DC today to [facility] for respite, family awaiting decision from [facility]. [facility] will provide [place] services and have arranged for supplemental LPN through [facility] KASIE Saul  12:06 PM 
CM spoke w/ [facility] liaison Dacia Ramires, she is coordinating plan for [place] services at [facility], possible DC to [facility] for respite. Await response KASIE Saul  11:37 AM 
Awaiting confirmation from Yanique Segundo at [facility] that pt can return today w/ [facility] and private CNA to assist w/ drain care at [place] [address], BS  8:42 AM 
CM placed TC to [facility] and [facility] to confirm arrangements for [place] SN and private SN for 7 days/wk, and DC today LVM Pt placed on will call for [facility]/stretcher for today. Will follow and assist 
KASIE Saul

## 2021-02-26 NOTE — PROGRESS NOTES
02/26/21 3:07 PM 
Transition of Care Plan to SNF/Rehab 
 
SNF/Rehab Transition: 
Patient has been accepted to [facility] and meets criteria for admission. Patient will transported by [facility] and expected to leave at 6:45PM. Communication to Patient/Family: CM spoke with patient, his daughter Kvng Vera, and his wife Marta Burgos (identified care giver) and they are agreeable to the transition plan. Communication to SNF/Rehab: 
Bedside RN, Canelo Law, has been notified to update the transition plan to the facility and call report (phone number [phone]). Discharge information has been updated on the AVS and sent to the facility. Nursing Please include all hard scripts for controlled substances, med rec and dc summary, and AVS in packet. Reviewed and confirmed with facility, [facility], can manage the patient care needs for the following: SNF/Rehab Transition: 
Patient to follow-up with Home Health: Randy Joseph PCP/Specialist: will need new PCP, as previous PCP is from [facility] Reviewed and confirmed with facility, [facility] they can manage the patient care needs for the following:  
 
Marian Santiago with (X) only those applicable: 
 
Medication: 
[x]  Medications will be available at the facility [x]  PO Antibiotics [x]  Controlled Substance - hard copy to be sent with patient  
[]  Weekly Labs Documents: 
[x] Hard RX [x] MAR [x] Kardex [x] AVS 
[x]Transfer Summary 
[x]Discharge Equipment: 
[]  CPAP/BiPAP []  Wound Vacuum [x]  Singh or Urinary Device 
[]  PICC/Central Line 
[]  Nebulizer 
[]  Ventilator Treatment: 
[x]Isolation (for COVID.) [x]Surgical Drain Management []Tracheostomy Care 
[]Dressing Changes []Dialysis with transportation and chair time. []PEG Care []Oxygen []Daily Weights for Heart Failure Dietary: 
[]Any diet limitations []Tube Feedings []Total Parenteral Management (TPN) Eligible for Medicaid Long Term Services and Supports Yes: 
[] Eligible for medical assistance or will become eligible within 180 days and UAI completed. [] Provider/Patient and/or support system has requested screening. [] UAI copy provided to patient or responsible party. [] UAI unavailable at discharge will send once processed to SNF provider. [] UAI unavailable at discharged mailed to patient No:  
[x] Private pay and is not financially eligible for Medicaid within the next 180 days. [] Reside out-of-state. [] A residents of a state owned/operated facility that is licensed 
by [facility] and [facility] or [facility] 
[] Enrollment in [facility] hospice services 
[]  [address] 
[] Patient /Family declines to have screening completed or provide financial information for screening Financial Resources: 
Medicaid   
[] Initiated and application pending  
[] Full coverage Advanced Care Plan: 
[]Surrogate Decision Maker of Care 
[]POA [x]Communicated Code Status DNR Other DAVID Torres

## 2021-02-26 NOTE — PROGRESS NOTES
Report called to Lluvia Vee RN at [facility]. Reviewed, Zamzam Espinosa recent results, VS and assessment. Opportunity for questions/clarificaion provided.

## 2021-02-27 NOTE — PROGRESS NOTES
Report given to transport team all questions answered. Urinary catheter in place drainage on RLQ in place.

## 2021-03-11 PROBLEM — J96.00 ACUTE RESPIRATORY FAILURE (HCC): Status: ACTIVE | Noted: 2021-01-01

## 2021-03-11 NOTE — PROGRESS NOTES
Spiritual Care Assessment/Progress Note [facility] [place], [place] 
 
 
NAME: Nichole Navarrete      MRN: [id number] AGE: 80 y.o. SEX: male Sikh Affiliation: Orthodox Language: [place] 3/11/2021     Total Time (in minutes): 9 Spiritual Assessment begun in [facility] EMERGENCY DEPT through conversation with: 
  
    []Patient        [] Family    [] Friend(s) Reason for Consult: End-of-life support Spiritual beliefs: (Please include comment if needed) 
   [] Identifies with a sven tradition:     
   [] Supported by a sven community:        
   [] Claims no spiritual orientation:       
   [] Seeking spiritual identity:            
   [] Adheres to an individual form of spirituality:       
   [x] Not able to assess:                   
 
    
Identified resources for coping:  
   [] Prayer                           
   [] Music                  [] Guided Imagery 
   [] Family/friends                 [] Pet visits [] Devotional reading                         [x] Unknown 
   [] Other:                                         
 
 
Interventions offered during this visit: (See comments for more details) Patient Interventions: Other (comment)(Unable to assess) Plan of Care: 
 
 [] Support spiritual and/or cultural needs  
 [] Support AMD and/or advance care planning process    
 [] Support grieving process 
 [] Coordinate Rites and/or Rituals  
 [] Coordination with community clergy [] No spiritual needs identified at this time 
 [] Detailed Plan of Care below (See Comments)  [] Make referral to Music Therapy 
[] Make referral to Pet Therapy    
[] Make referral to Addiction services 
[] Make referral to [facility] 
[] Make referral to Spiritual Care Partner 
[] No future visits requested       
[x] Follow up upon further referrals Comments:  attempted to visit Mr. Paul Rowley for an End-of-Life visit in the ER.  Dr. Sapphire Guerrero and family were present at the bedside and it appeared that other family were joining the meeting via [address] [place] [address].  was unable to assess at this time. 's are available for further support upon referral 
Ivan Grady. Tyler Ivey.  Paging Service: [phone])

## 2021-03-11 NOTE — TELEPHONE ENCOUNTER
We are not PCP- walter Hernandez PCP was Karissa Lee MD last seen in 2017. [facility] notified.

## 2021-03-11 NOTE — CONSULTS
Palliative Medicine Consult [place]: [phone] ([phone]) Patient Name: Della Armstrong YOB: 1930 Date of Initial Consult: 3/11/21 Reason for Consult: End of life care Requesting Provider: Dr. May Roberto Primary Care Physician: Georgette Yu MD 
 
 SUMMARY:  
Della Armstrong is a 80 y.o. 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 He was discharged on 2/25 to [facility] for skilled care, then returned home 3/10 to be with his family. Home health came to see him today 3/11 and found him minimally responsive they called 911. His wife and daughter Karen Steiner talked with Dr. May Roberto in the ED and decision was made not to pursue invasive workup or diagnostics but to proceed with comfort focused care. Wife asked for him to come home with Hospice support so that he can die at home. Current medical issues leading to Palliative Medicine involvement include: end of life care. Upon initial ED assessment he was found to be saturating 100% on a NRB and intermittently responsive, mostly somnolent. While awake he denied pain. RN reported team unable to obtain blood pressures with automatic cuff, manual cuff readings were in the systolic of 48S. His O2 sat was in the 60s as well prior to NRB being placed. Psychosocial Hx-  , four children, one daughter local, at least two reside in [place]. Was living at [address] [place] [address], wife moved to [facility] within the past few weeks. PALLIATIVE DIAGNOSES:  
1. End of life care 2. Acute  Hypoxic respiratory failure 3. Hypoactive delirium 4. Chronic debility 5. Cholecystitis- cholecystostomy placed 2/19 6. Urinary retention- indwelling ernst in place PLAN:  
1. Wife and one daughter arrived at bedside, remaining children by Zoom.   Confirmed plan to focus on his comfort and not perform diagnostics or life prolonging interventions. 2. Reason for acute decline not entirely clear. Suspect recurrent sepsis as he remains at risk given presence of cholecystostomy. He had a recent transition out of rehab back to home, it is also unclear exactly what medicines he took this morning prior to his decompensation, it is possible he took his previously prescribed antihypertensives which are currently resulting in hypotension. Will need to monitor for natural recovery / stability. Either way he is not safe to transition home at this moment given dependence upon NRB. 3. Discussed with Hospice RN Ravin Kahn who will admit patient to GIP level of care today and facilitate transition to home in coming days if he does stabilize. 4. Placed IV for means to administer comfort medicines intravenously as at this time he cannot reliably tolerate SL meds. 5. Transition from NRB to NC oxygen once access obtained. 6. Start morphine 2 mg IV q15 min prn for labored breathing and/or mod-severe pain. 7. Start lorazepam 1 mg IV q15 min prn for labored breathing unrelieved by morphine or agitation. 8. Code status- DNR- will need DDNR if he transitions home. 9. Thank you for the opportunity to participate in the care of [address] [place] [address] 10. Communicated plan of care with: Palliative [place] [facility] Team 
 
 GOALS OF CARE / TREATMENT PREFERENCES:  
 
GOALS OF CARE:  comfort TREATMENT PREFERENCES:  
Code Status: Prior Advance Care Planning: 
[x] The Pall Med Interdisciplinary Team has updated the ACP Navigator with [address] and Patient Capacity Spouse-  Marta Burgos is medical decision maker. She is well supported by her four children. No flowsheet data found. Other: As far as possible, the palliative care team has discussed with patient / health care proxy about goals of care / treatment preferences for patient. HISTORY:  
 
History obtained from:  ED RN team, wife, children CHIEF COMPLAINT: minimally responsive at home HPI/SUBJECTIVE: The patient is:  
[] Verbal and participatory [x] Non-participatory due to:  Delirium, medical condition Clinical Pain Assessment (nonverbal scale for severity on nonverbal patients): Duration: for how long has pt been experiencing pain (e.g., 2 days, 1 month, years) Frequency: how often pain is an issue (e.g., several times per day, once every few days, constant) FUNCTIONAL ASSESSMENT:  
  
Palliative Performance Scale (PPS):   10 PSYCHOSOCIAL/SPIRITUAL SCREENING:  
 
Palliative IDT has assessed this patient for cultural preferences / practices and a referral made as appropriate to needs (Cultural Services, Patient Advocacy, Ethics, etc.) Any spiritual / Taoist concerns: 
[] Yes /  [x] No 
 
Caregiver Burnout: 
[] Yes /  [x] No /  [] No Caregiver Present Anticipatory grief assessment:  
[x] Normal  / [] Maladaptive ESAS Anxiety: ESAS Depression:    
 
 
 REVIEW OF SYSTEMS:  
 
Positive and pertinent negative findings in ROS are noted above in HPI. The following systems were [x] reviewed / [] unable to be reviewed as noted in HPI Other findings are noted below. Systems: constitutional, ears/nose/mouth/throat, respiratory, gastrointestinal, genitourinary, musculoskeletal, integumentary, neurologic, psychiatric, endocrine. Positive findings noted below. Modified ESAS Completed by: provider PHYSICAL EXAM:  
 
From RN flowsheet: 
Wt Readings from Last 3 Encounters:  
03/11/21 181 lb 4.8 oz (82.2 kg) 02/23/21 184 lb (83.5 kg)  
11/14/13 185 lb (83.9 kg) Blood pressure (!) 58/53, pulse 98, temperature 97.5 °F (36.4 °C), resp. rate 16, weight 181 lb 4.8 oz (82.2 kg), SpO2 99 %.  
 
  
  
  
  
  
  
  
Last bowel movement, if known:  
 
acutely ill appearing white male lying in bed; appears comfortable Respirations unlabored on NRB mask at 15L/100%. Abdomen obese, soft, bs soft, no grimacing or vocalization with palpation Somnolent, opens eyes on occasion and interacts but not able to stay awake for prolonged conversation. Not moving limbs. Not agitated nor restless HISTORY:  
 
Active Problems: * No active hospital problems. * 
 
Past Medical History:  
Diagnosis Date  Contact dermatitis and other eczema, due to unspecified cause  Hypercholesterolemia  Hypertension Past Surgical History:  
Procedure Laterality Date  HX APPENDECTOMY  HX PROSTATECTOMY  1986  
 for BPH  
 HX UROLOGICAL    
 IR CHOLECYSTOSTOMY PERCUTANEOUS SI  2/19/2021 Family History Problem Relation Age of Onset  Cancer Mother History reviewed, no pertinent family history. Social History Tobacco Use  Smoking status: Never Smoker  Smokeless tobacco: Never Used Substance Use Topics  Alcohol use: Yes Comment: 2 OZ A DAY No Known Allergies Current Facility-Administered Medications Medication Dose Route Frequency  glycopyrrolate (ROBINUL) injection 0.2 mg  0.2 mg IntraVENous Q4H PRN  
 morphine injection 2 mg  2 mg IntraVENous Q15MIN PRN  
 LORazepam (ATIVAN) injection 1 mg  1 mg IntraVENous Q15MIN PRN Current Outpatient Medications Medication Sig  tamsulosin (FLOMAX) 0.4 mg capsule Take 1 Cap by mouth daily.  metoprolol tartrate (LOPRESSOR) 25 mg tablet Take 25 mg by mouth two (2) times a day.  simvastatin (ZOCOR) 20 mg tablet Take 20 mg by mouth nightly.  levothyroxine (SYNTHROID) 50 mcg tablet Take 50 mcg by mouth Daily (before breakfast).  terazosin (HYTRIN) 10 mg capsule Take 10 mg by mouth two (2) times a day.  aspirin delayed-release 81 mg tablet Take 81 mg by mouth nightly.  loperamide (IMODIUM) 2 mg capsule Take 2 mg by mouth every six (6) hours as needed for Diarrhea.   
 escitalopram oxalate (LEXAPRO) 20 mg tablet Take 20 mg by mouth daily.  allopurinol (ZYLOPRIM) 100 mg tablet TAKE 1 TABLET BY MOUTH TWICE A DAY TO PREVENT GOUT  
 
 
 
 LAB AND IMAGING FINDINGS:  
 
Lab Results Component Value Date/Time WBC 13.8 (H) 02/25/2021 01:19 AM  
 HGB 12.1 02/25/2021 01:19 AM  
 PLATELET 355 71/34/4439 01:19 AM  
 
Lab Results Component Value Date/Time Sodium 144 02/25/2021 01:19 AM  
 Potassium 3.2 (L) 02/25/2021 01:19 AM  
 Chloride 113 (H) 02/25/2021 01:19 AM  
 CO2 23 02/25/2021 01:19 AM  
 BUN 25 (H) 02/25/2021 01:19 AM  
 Creatinine 1.21 02/25/2021 01:19 AM  
 Calcium 7.8 (L) 02/25/2021 01:19 AM  
 Magnesium 1.5 (L) 02/18/2021 02:23 PM  
 Phosphorus 2.7 11/09/2009 03:25 AM  
  
Lab Results Component Value Date/Time Alk. phosphatase 66 02/25/2021 01:19 AM  
 Protein, total 5.0 (L) 02/25/2021 01:19 AM  
 Albumin 1.9 (L) 02/25/2021 01:19 AM  
 Globulin 3.1 02/25/2021 01:19 AM  
 
Lab Results Component Value Date/Time INR 1.4 (H) 02/20/2021 08:30 AM  
 Prothrombin time 14.5 (H) 02/20/2021 08:30 AM  
  
Lab Results Component Value Date/Time Iron 58 06/01/2010 01:20 PM  
 Ferritin 69 06/01/2010 01:20 PM  
  
Lab Results Component Value Date/Time pH 7.32 (LL) 10/18/2009 05:45 AM  
 PCO2 37 10/18/2009 05:45 AM  
 PO2 91 10/18/2009 05:45 AM  
 
No components found for: [place] No results found for: CPK, CKMB Total time:  70m Counseling / coordination time, spent as noted above: 45m 
> 50% counseling / coordination?: y 
 
Prolonged service was provided for  []30 min   []75 min in face to face time in the presence of the patient, spent as noted above. Time Start:  
Time End:  
Note: this can only be billed with 60906 (initial) or 15517 (follow up). If multiple start / stop times, list each separately.

## 2021-03-11 NOTE — ED TRIAGE NOTES
Patient arrives via EMS from assisted living for altered mental status. Patient with incomprehensible speech, not following commands. Went home with hospice yesterday at 5pm from rehab, per patient he stated that he just wanted one more day with his wife. Code status unclear at this time. EMS called by home health for patient being unresponsive. Wife to be contacted by provider. Code stroke level 1 called at this time.  History of Covid in January and Polio as a child, nephrectomy, right sided drain placed

## 2021-03-11 NOTE — HOSPICE
[employer] Good Help to Those in Need 
[phone] Patient Name: Keshia Escalera YOB: 1930 Age: 80 y.o. [employer] RN Note:  Hospice consult received, reviewing chart. Will follow up with Unit Nurse and Care Manager to discuss plan of care, patient status and discharge disposition within the hour. Patient seen in ED, no family at bedside. Appears imminent. Nurse called family and they will notify me when they arrive Palliative will write comfort medication orders Thank you for the opportunity to be of service to this patient. Stefano Wilson RN Clinical Nurse Liaison [place] [phone]

## 2021-03-11 NOTE — PROGRESS NOTES
3/11/2021 
2:36 PM 
Date of previous inpatient admission/ ED visit? 2/18- 2/26/21 What brought the patient back to ED? Pt unresponsive Did patient decline recommended services during last admission/ ED visit (if yes, what)? Pt initially declined to DC to [facility] rehab, family was able to hae pt agree to DC Has patient seen a provider since their last inpatient admission/ED visit (if yes, when)? N/A 
 
PCP: First and Last name:Unnown Name of Practice:  
 Are you a current patient: Yes/No:  
 Approximate date of last visit: 
 
CM Interventions: 
From previous inpatient admission/ED visit:CM arranged for DC to [place] [address] for rehab services From current inpatient admission/ED visit Pt was discharged from [place] [address] 3/1/21 to home at [facility], pt found by wife unresponsive today, staff called EMS, despite wife stating she did not want treatment, pt was brought by EMS. Family desires home hospice and wants pt home today. CM spoke w/ pt's wife Rogelio Mathew [phone], verbal consent for referral to [facility], sent in [address] [place] [address]. CM notified Luz Claude liaison, she will contact family. Will follow and assist. 
Lonnie Hood, KASIE

## 2021-03-11 NOTE — PROGRESS NOTES
Spiritual Care Assessment/Progress Note [address] 
 
 
NAME: Nany Stone      MRN: [id number] AGE: 80 y.o. SEX: male Scientology Affiliation: Orthodoxy Language: [place] 3/11/2021     Total Time (in minutes): 12 Spiritual Assessment begun in [facility] EMERGENCY DEPT through conversation with: 
  
    []Patient        [x] Family    [] Friend(s) Reason for Consult: End-of-life support Spiritual beliefs: (Please include comment if needed) [x] Identifies with a sven tradition: Orthodoxy    
   [] Supported by a sven community:        
   [] Claims no spiritual orientation:       
   [] Seeking spiritual identity:            
   [] Adheres to an individual form of spirituality:       
   [] Not able to assess:                   
 
    
Identified resources for coping:  
   [] Prayer                           
   [] Music                  [] Guided Imagery [x] Family/friends                 [] Pet visits [] Devotional reading                         [] Unknown 
   [] Other:                                          
 
 
Interventions offered during this visit: (See comments for more details) Patient Interventions: Other (comment)(Unable to assess) Family/Friend(s): Affirmation of sven, Prayer (actual) Plan of Care: 
 
 [] Support spiritual and/or cultural needs  
 [] Support AMD and/or advance care planning process [x] Support grieving process 
 [] Coordinate Rites and/or Rituals  
 [] Coordination with community clergy [] No spiritual needs identified at this time 
 [] Detailed Plan of Care below (See Comments)  [] Make referral to Music Therapy 
[] Make referral to Pet Therapy    
[] Make referral to Addiction services 
[] Make referral to [facility] 
[] Make referral to Spiritual Care Partner 
[] No future visits requested       
[x] Follow up upon further referrals Comments:  visited Mr. Venus Canada for an end-of-life visit in the ER. Mr. Mr. Brian Garcia was lying in bed and appeared to be resting comfortably. His wife, Ronnie Menchaca and daughter were at the bedside and several other family members were present via Zoom call.  introduced herself and extended support. Family requested prayer for peace and comfort.  provided prayer as requested. Family confirmed Mr. [address] and family requested a  to visit if possible. They are not affiliated with a  in the area.  to make a referral to [facility] [employer] [place]. Family thanked the  for her visit and expressed no additional needs at this time. 's are available for further support upon referral 
Ivan Sifuentes.  Paging Service: [phone] ([phone])

## 2021-03-11 NOTE — ED NOTES
Dr Zaid Davis and this RN spoke to both Chico Ramirez (wife) and Albert Mack (daughter) regarding Mr Mayito Pulliam wishes for end of life and their wishes for his care. Witnessed conversation included DNR and comfort measures. Wife and daughter also expressed they wish he could come back to their residence at [facility] for hospice care.  made aware and comfort care orders placed. DNR form filled out by Dr Zaid Davis and this nurse.

## 2021-03-11 NOTE — PROGRESS NOTES
[facility]: MED RECONCILIATION Comments/Recommendations:  
 
Patient is now comfort care. Out of respect for the family, med rec interview with patient/family was not performed. Medication Documentation Review Audit Reviewed by VIDA OliverosD (Pharmacist) on 03/11/21 at 3071 Medication Sig Documenting Provider Last Dose Status Taking?  
allopurinol (ZYLOPRIM) 100 mg tablet TAKE 1 TABLET BY MOUTH TWICE A DAY TO PREVENT GOUT Mara Ley MD  Active   
aspirin delayed-release 81 mg tablet Take 81 mg by mouth nightly. Provider, Historical  Active   
escitalopram oxalate (LEXAPRO) 20 mg tablet Take 20 mg by mouth daily. Provider, Historical  Active   
levothyroxine (SYNTHROID) 50 mcg tablet Take 50 mcg by mouth Daily (before breakfast). Provider, Historical  Active   
loperamide (IMODIUM) 2 mg capsule Take 2 mg by mouth every six (6) hours as needed for Diarrhea. Provider, Historical  Active   
metoprolol tartrate (LOPRESSOR) 25 mg tablet Take 25 mg by mouth two (2) times a day. Provider, Historical  Active   
simvastatin (ZOCOR) 20 mg tablet Take 20 mg by mouth nightly. Provider, Historical  Active   
tamsulosin (FLOMAX) 0.4 mg capsule Take 1 Cap by mouth daily. Raciel Melendez MD  Active   
terazosin (HYTRIN) 10 mg capsule Take 10 mg by mouth two (2) times a day. Provider, Historical  Active Thank you, 
Helio SÁNCHEZ, [address]

## 2021-03-11 NOTE — ED PROVIDER NOTES
She is a 19-year-old gentleman who was recently hospitalized for sepsis, Covid, multiorgan failure. He was at a skilled nursing facility but left because he wanted to be home with his family and his wife and did not want to die in the hospital nursing facility. According to EMS patient was home without home health service but when the home health service found him minimally responsive they called 911 and had him transported to the hospital.  His wife is at bedside and stated to the EMS that he did not want resuscitation, intubation, or any interventions. I then spoke to the patient's wife and daughter and confirmed that all they desire is for Mr. Rodriguez to return home so that he can be with his wife and family. I confirmed that they do not want any evaluation or treatment and I will work with case management to try to get the patient home on hospice care with comfort measures in accordance with his desires as stated by his family. Past Medical History:  
Diagnosis Date  Contact dermatitis and other eczema, due to unspecified cause  Hypercholesterolemia  Hypertension Past Surgical History:  
Procedure Laterality Date  HX APPENDECTOMY  HX PROSTATECTOMY  1986  
 for BPH  
 HX UROLOGICAL    
 IR CHOLECYSTOSTOMY PERCUTANEOUS SI  2/19/2021 Family History:  
Problem Relation Age of Onset  Cancer Mother Social History Socioeconomic History  Marital status:  Spouse name: Not on file  Number of children: Not on file  Years of education: Not on file  Highest education level: Not on file Occupational History  Not on file Social Needs  Financial resource strain: Not on file  Food insecurity Worry: Not on file Inability: Not on file  Transportation needs Medical: Not on file Non-medical: Not on file Tobacco Use  Smoking status: Never Smoker  Smokeless tobacco: Never Used Substance and Sexual Activity  Alcohol use: Yes Comment: 2 OZ A DAY  Drug use: No  
 Sexual activity: Never Lifestyle  Physical activity Days per week: Not on file Minutes per session: Not on file  Stress: Not on file Relationships  Social connections Talks on phone: Not on file Gets together: Not on file Attends Synagogue service: Not on file Active member of club or organization: Not on file Attends meetings of clubs or organizations: Not on file Relationship status: Not on file  Intimate partner violence Fear of current or ex partner: Not on file Emotionally abused: Not on file Physically abused: Not on file Forced sexual activity: Not on file Other Topics Concern  Not on file Social History Narrative  Not on file ALLERGIES: Patient has no known allergies. Review of Systems Unable to perform ROS: Patient nonverbal  
 
 
Vitals:  
 03/11/21 1353 03/11/21 1410 Pulse: 93 Resp: 16 Temp: 97.5 °F (36.4 °C) SpO2: 94% Weight:  82.2 kg (181 lb 4.8 oz) Physical Exam 
Vitals signs and nursing note reviewed. Constitutional:   
   Appearance: He is ill-appearing. Cardiovascular:  
   Rate and Rhythm: Normal rate and regular rhythm. Pulmonary:  
   Effort: Accessory muscle usage present. Breath sounds: Rhonchi present. Abdominal:  
   General: There is distension. Palpations: Abdomen is soft. Tenderness: There is no abdominal tenderness. Genitourinary: 
   Comments: Nephrostomy tube Skin: 
   General: Skin is warm and dry. Comments: Anasarca Neurological:  
   Mental Status: He is alert. He is confused. Cranial Nerves: Dysarthria present. 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  Wife and daughter were at his bedside and other family members took place timing with him.

## 2021-03-11 NOTE — PROGRESS NOTES
Responded to request from  and provided Anointing and Commendation of the Dying for patient. Severo Villa

## 2021-03-11 NOTE — TELEPHONE ENCOUNTER
----- Message from Ellie Schwartz sent at 3/11/2021 12:42 PM EST -----  Regarding: telephone  General Message/Vendor Calls    Caller's first and last name: Shimon Centeno with  home health care       Reason for call: Calling to see if Dr Maida Clement was seeing this patient while at [facility] required yes/no and why: Yes to verify if he is following the PT for care       Best contact number(s): [phone]      Details to clarify the request:      Ellie Schwartz

## 2021-03-11 NOTE — ED NOTES
TRANSFER - OUT REPORT: 
 
Verbal report given to Owen(name) on Tayler Oneal  being transferred to 5th floor(unit) for routine progression of care Report consisted of patients Situation, Background, Assessment and  
Recommendations(SBAR). Information from the following report(s) SBAR, ED Summary and MAR was reviewed with the receiving nurse. Lines:  
Peripheral IV 03/11/21 Left Forearm (Active) Site Assessment Clean, dry, & intact 03/11/21 1639 Phlebitis Assessment 0 03/11/21 1639 Infiltration Assessment 0 03/11/21 1639 Dressing Status Clean, dry, & intact 03/11/21 1639 Dressing Type Transparent;Tape 03/11/21 1639 Hub Color/Line Status Pink;Patent; Flushed 03/11/21 1639 Opportunity for questions and clarification was provided. Patient transported with: 
 [employer]

## 2021-03-11 NOTE — HOSPICE
[employer] Good Help to Those in Need 
[phone] Inpatient Nursing Admission Patient Name: Sosa Brasher YOB: 1930 Age: 80 y.o. Date of Hospice Admission: 3/11/2021 Hospice Attending Elected by Patient: Uzair Gonzales MD 
Primary Care Physician: Nasima Hou MD 
Admitting RN: Perez Zelaya RN : DAVID Mora Level of Care (GIP/Routine/Respite): GIP Facility of Care: [facility] Patient Room: ER10/10 HOSPICE SUMMARY  
ER Visits/ Hospitalizations in past year: 3 Hospice Diagnosis: Acute respiratory failure ([address] [J96.00] Onset Date of Hospice Diagnosis: 3/11/2021 Summary of Disease Progression Leading to Hospice Diagnosis:  
Per Dr Myriam Vera, Palliative Medicine 3/11/21: 
Sosa Brasher is a 80 y.o. 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 He was discharged on 2/25 to [facility] for skilled care, then returned home 3/10 to be with his family. Home health came to see him today 3/11 and found him minimally responsive they called 911. His wife and daughter Hiram Taylor talked with Dr. Ellison Monday in the ED and decision was made not to pursue invasive workup or diagnostics but to proceed with comfort focused care. Wife asked for him to come home with Hospice support so that he can die at home. Current medical issues leading to Palliative Medicine involvement include: end of life care.   
  
Upon initial ED assessment he was found to be saturating 100% on a NRB and intermittently responsive, mostly somnolent. While awake he denied pain. RN reported team unable to obtain blood pressures with automatic cuff, manual cuff readings were in the systolic of 15F.   His O2 sat was in the 60s as well prior to NRB being placed.  
  
Psychosocial Hx-  , four children, one daughter local, at least two reside in [place]. Was living at [address] [place] [address], wife moved to [facility] within the past few weeks.    
  
 PALLIATIVE DIAGNOSES:  
1. End of life care 2. Acute  Hypoxic respiratory failure 3. Hypoactive delirium 4. Chronic debility 5. Cholecystitis- cholecystostomy placed 2/19 6. Urinary retention- indwelling ernst in place 
  
  
 PLAN:  
1. Wife and one daughter arrived at bedside, remaining children by Zoom. Confirmed plan to focus on his comfort and not perform diagnostics or life prolonging interventions. 2. Reason for acute decline not entirely clear. Suspect recurrent sepsis as he remains at risk given presence of cholecystostomy. He had a recent transition out of rehab back to home, it is also unclear exactly what medicines he took this morning prior to his decompensation, it is possible he took his previously prescribed antihypertensives which are currently resulting in hypotension. Will need to monitor for natural recovery / stability. Either way he is not safe to transition home at this moment given dependence upon NRB. 3. Discussed with Hospice RN Constanza Kumari who will admit patient to GIP level of care today and facilitate transition to home in coming days if he does stabilize. 4. Placed IV for means to administer comfort medicines intravenously as at this time he cannot reliably tolerate SL meds. 5. Transition from NRB to NC oxygen once access obtained. 6. Start morphine 2 mg IV q15 min prn for labored breathing and/or mod-severe pain. 7. Start lorazepam 1 mg IV q15 min prn for labored breathing unrelieved by morphine or agitation. 8. Code status- DNR- will need DDNR if he transitions home. 9. Thank you for the opportunity to participate in the care of [address] [place] [address] 10. Communicated plan of care with: Palliative [facility] Team 
 
Hospice consult 3/11/21: Met with Dr Concepcion Granger and family at bedside. Patient is minimally responsive in respiratory distress. Not clear origin of his decline but suspect sepsis. Family is in agreement with GIP hospice admission at this time. Should he stabilize his wife would like to take him home with support of hospice Co-Morbidities:  
Patient Active Problem List  
Diagnosis Code  Polio A80.9  Colon polyps K63.5  
 HTN (hypertension) I10  
 High cholesterol E78.00  Basal cell cancer C44.91  
 Rosacea L71.9  Hyperglycemia R73.9  
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  
 Microalbuminuria R80.9  DVT (deep venous thrombosis) (HCC) I82.409  Hematuria R31.9  Atrial fibrillation (HCC) I48.91  
 Acute cholecystitis K81.0  Hypokalemia E87.6  Lactic acidosis E87.2  Hyperbilirubinemia E80.6  Leukocytosis D72.829  
 GIB (gastrointestinal bleeding) K92.2  RUQ pain R10.11  Severe sepsis (HCC) A41.9, R65.20  Acute epigastric pain R10.13  Abdominal pain, acute, right upper quadrant R10.11  
 Acute metabolic encephalopathy I12.59  
 Acute respiratory failure (HCC) J96.00 Rationale for a prognosis of life expectancy of 6 months or less if the disease follows its normal course (Disease Specific History):  
 
Royce Morales is a 80 y.o. who was admitted to [facility]. The patient's principle diagnosis of acute respiratory failure has resulted in pursuit of hospice. Functionally, the patient's Palliative Performance Scale has declined over a period of days  and is estimated at 10. Objective information that support this patients limited prognosis includes: minimally responsive, labored breathing, restless, hypotensive. The patient/family chose comfort measures with the support of Hospice. Patient meets for GIP  LOC as evidenced by : need for IV morphine and ativan to manage respiratory distress, agitation and generalized pain Prognosis estimated based on 03/11/21 clinical assessment is:  
[x] Few to Many Hours [] Hours to Days  
[] Few to Many Days  
[] Days to Weeks  
[] Few to Many Weeks  
[] Weeks to Months  
[] Few to Many Months ASSESSMENT Patient self-reports:  []  Yes    [x] No 
 
SYMPTOMS: dyspnea, pain, agitation, minimally responsive SIGNS/PHYSICAL FINDINGS: minimally responsive with periods of agitation, labored breathing with use of accessory muscles. KARNOFSKY: 10 
 
FAST for all dementia:   
 
Learning Assessment: 
Patient Is patient willing/able to learn? no What is the highest level of education completed? Learning preference (written material, demonstration, visual)? Learning barriers (ESOL, [place], poor vision)? Caregiver Is caregiver willing to learn care for patient? yes What is the highest level of education completed? unknown Learning preference (written material, demonstration, visual)? verbal 
Learning barriers (ESOL, [place], poor vision)? none CLINICAL INFORMATION Wt Readings from Last 3 Encounters:  
03/11/21 82.2 kg (181 lb 4.8 oz) 02/23/21 83.5 kg (184 lb)  
11/14/13 83.9 kg (185 lb) Ht Readings from Last 3 Encounters:  
02/23/21 5' 4\" (1.626 m)  
10/14/13 5' 4\" (1.626 m)  
09/21/12 5' 4\" (1.626 m) Body mass index is 31.12 kg/m². Visit Vitals BP (!) 58/53 (BP 1 Location: Left upper arm) Pulse 98 Temp 97.5 °F (36.4 °C) Resp 16 Wt 82.2 kg (181 lb 4.8 oz) SpO2 99% BMI 31.12 kg/m² LAB VALUES No results found for this visit on 03/11/21 (from the past 12 hour(s)). No results found for this visit on 03/11/21 (from the past 6 hour(s)). Lab Results Component Value Date/Time Protein, total 5.0 (L) 02/25/2021 01:19 AM  
 Albumin 1.9 (L) 02/25/2021 01:19 AM  
 
Currently this patient has: 
[x] Supplemental O2 [x] Peripheral IV  [] PICC    [] PORT [x] Singh Catheter [] NG Tube   [] PEG Tube [] Ostomy   
[] AICD: Has ICD been deactivated? [] Yes [] No:______ PLAN 1. Education of patient and family regarding end of life care and Hospice plan of care 2. Provide education and support to unit staff caring for hospice patient and family. Provide staff with direct contact information to reach hospice team [phone] 3. Provide support and frequent rounds for patient comfort and safety ongoing 4. Provide  support ongoing, continue to discuss discharge plan if patient becomes mary and does not require acute nursing care interventions for GIP level of care 5. Provide  and bereavement support ongoing 6. Schedule ativan 1mg IV every 4hours 7. Schedule morphine 2mg IV every 4 hours 8. Add PRN comfort set 9. Maintain skin integrity as tolerated for hospice patient, turning and repositioning for comfort, and specialty mattress if appropriate 10. Singh care per hospital policy for infection prevention 11. Central line care as per hospital policy for infection prevention 12. Care and drainage of cholesystomy drain 13. Caregiver Support: Provide emotional support to wife and children Hospice Team Frequency Orders: 
Skilled Nurse -   Daily x 7 days . MSW  1 visit for initial assessment/evaluation for family support and need for volunteer services. Parker Ibarra  1 visit for initial assessment/evaluation for spiritual support. ADVANCE CARE PLANNING (Complete in ACP Flow Sheet) Code Status: DNR Durable DNR: [x]  Yes  []  No 
Code Status Discussed/Confirmed:YES Preference for Other Life Sustaining Treatment Discussed/Confirmed:YES Hospitalization [address] No flowsheet data found.  Service: [] Yes  []  No      [x] Unknown Appropriate for Pinning Ceremony:  [] Yes     [] No 
Muslim: Jain  Home: TBD 
 
DISCHARGE PLANNING 1. Discharge Plan: will likely  in the hospital but should he stabilize his family would like to take him home with the support of hospice 2.  Patient/Family teaching: hospice philosophy, levels of care and services to be provided. NCD declined at this time while GIP. End of life symptoms and care 3. Response to patient/family teaching: family verbalized understanding SOCIAL/EMOTIONAL/SPIRITUAL NEEDS Spiritual Issues Identified: Hospital  visited in ED Psych/ Social/ Emotional Issues Identified: lives with wife. Daughter at bedside and other children were on zoom call in ED. Family aware he may pass soon, All in agreement he would not want any aggressive treatment, 
 
Caregiver Support: 
[x] Provided information on End of Life Care  
[x] Material Provided: Gone From My Sight or [facility]  
 
CARE COORDINATION  
 contacted, discharge to hospice order received Dr. Patrick Nichole contacted, agrees to serve as attending provider for hospice and provided verbal certification of terminal illness with life expectancy of 6 months or less. Orders for hospice admission, medications and plan of treatment received. Medication reconciliation completed. MEDS: See medication list below DME: Per hospital 
Supplies: Per hospital 
IDT communication to include MD, SN, SW, CH and support team 
 
ALLERGIES AND MEDICATIONS Allergies: No Known Allergies Current Facility-Administered Medications Medication Dose Route Frequency  glycopyrrolate (ROBINUL) injection 0.2 mg  0.2 mg IntraVENous Q4H PRN  
 morphine injection 2 mg  2 mg IntraVENous Q15MIN PRN  
 LORazepam (ATIVAN) injection 1 mg  1 mg IntraVENous Q15MIN PRN  
 morphine injection 2 mg  2 mg IntraVENous Q4H  
 LORazepam (ATIVAN) injection 1 mg  1 mg IntraVENous Q4H  
 bisacodyL (DULCOLAX) suppository 10 mg  10 mg Rectal DAILY PRN Current Outpatient Medications Medication Sig  tamsulosin (FLOMAX) 0.4 mg capsule Take 1 Cap by mouth daily.  metoprolol tartrate (LOPRESSOR) 25 mg tablet Take 25 mg by mouth two (2) times a day.  simvastatin (ZOCOR) 20 mg tablet Take 20 mg by mouth nightly.   
 levothyroxine (SYNTHROID) 50 mcg tablet Take 50 mcg by mouth Daily (before breakfast).  terazosin (HYTRIN) 10 mg capsule Take 10 mg by mouth two (2) times a day.  aspirin delayed-release 81 mg tablet Take 81 mg by mouth nightly.  loperamide (IMODIUM) 2 mg capsule Take 2 mg by mouth every six (6) hours as needed for Diarrhea.  escitalopram oxalate (LEXAPRO) 20 mg tablet Take 20 mg by mouth daily.   
 allopurinol (ZYLOPRIM) 100 mg tablet TAKE 1 TABLET BY MOUTH TWICE A DAY TO PREVENT GOUT

## 2021-03-11 NOTE — PROGRESS NOTES
Spiritual Care Assessment/Progress Note [address] [place] [address] Dr 
 
 
NAME: Della Armstrong      MRN: [id number] AGE: 80 y.o. SEX: male Caodaism Affiliation: Scientology Language: [place] 3/11/2021     Total Time (in minutes): 13 Spiritual Assessment begun in [facility] EMERGENCY DEPT through conversation with: 
  
    []Patient        [] Family    [] Friend(s) Reason for Consult: Other (comment)(Code Stroke) Spiritual beliefs: (Please include comment if needed) 
   [] Identifies with a sven tradition:     
   [] Supported by a sven community:        
   [] Claims no spiritual orientation:       
   [] Seeking spiritual identity:            
   [] Adheres to an individual form of spirituality:       
   [x] Not able to assess:                   
 
    
Identified resources for coping:  
   [] Prayer                           
   [] Music                  [] Guided Imagery 
   [] Family/friends                 [] Pet visits [] Devotional reading                         [x] Unknown 
   [] Other:                                    
 
 
Interventions offered during this visit: (See comments for more details) Patient Interventions: Other (comment)(Attempted visit) Plan of Care: 
 
 [] Support spiritual and/or cultural needs  
 [] Support AMD and/or advance care planning process    
 [] Support grieving process 
 [] Coordinate Rites and/or Rituals  
 [] Coordination with community clergy [] No spiritual needs identified at this time 
 [] Detailed Plan of Care below (See Comments)  [] Make referral to Music Therapy 
[] Make referral to Pet Therapy    
[] Make referral to Addiction services 
[] Make referral to [facility] 
[] Make referral to Spiritual Care Partner 
[] No future visits requested       
[x] Follow up upon further referrals Comments: Responded to Code Stroke in ER. Staff was working with Mr. Ruthie Nguyen, no family was present at this time. Unable to assess. Contact Spiritual Care for any further referrals. Visited by: Ba Otto., MS., [address] ([id number])

## 2021-03-11 NOTE — PROGRESS NOTES
TRANSFER - IN REPORT: 
 
Verbal report received from Murali(name) on [place]  being received from ED(unit) for routine progression of care Report consisted of patients Situation, Background, Assessment and  
Recommendations(SBAR). Information from the following report(s) SBAR, Kardex, ED Summary and MAR was reviewed with the receiving nurse. Opportunity for questions and clarification was provided. Assessment completed upon patients arrival to unit and care assumed.

## 2021-03-11 NOTE — PROGRESS NOTES
Problem: Dyspnea Due to End of Life Goal: Demonstrate understanding of and ability to manage respiratory symptoms at end of life Outcome: Progressing Towards Goal 
  
Problem: Communication Deficit Goal: Effectively communicate symptoms, needs, and concerns Outcome: Progressing Towards Goal 
  
Problem: Imminent death Goal: Collaborate with patient/family/caregiver/interdisciplinary team to minimize and manage end of life symptoms Outcome: Progressing Towards Goal 
  
Problem: Hospice Orientation Goal: Demonstrate understanding of hospice philosophy, plan of care, and home hospice program 
Description: The patient/family/caregiver will demonstrate understanding of hospice philosophy, plan of care and the home hospice program as evidenced by participation in meeting the patient's psychosocial, spiritual, medical, and physical needs inclusive of medical supplies/equipment focusing on symptoms. Outcome: Progressing Towards Goal 
  
Problem: Potential for Skin Breakdown Goal: Demonstrate ability to care for skin, monitor areas of breakdown and demonstrate methods to prevent breakdown Description: Patient/family/caregiver will demonstrate ability to care for patient's skin, monitor for areas of breakdown, and demonstrate methods to prevent breakdown during hospice care. Outcome: Progressing Towards Goal 
  
Problem: Risk for Falls Goal: Free of falls during episode of care Description: Patient will be free of falls during episode of care. Outcome: Progressing Towards Goal 
  
Problem: Comfort Deficit Goal: Reduce/control pain Description: Patient will report that pain has been reduced or controlled through verbal and nonverbal means and that measures to promote comfort are effective. Outcome: Progressing Towards Goal 
  
Problem: Pain Goal: Verbalize satisfaction of level of comfort and symptom control Outcome: Progressing Towards Goal 
  
Problem: Anticipatory Grief Goal: Explore reactions to and verbalize acceptance of impending loss Description: Patient/family/caregiver will explore reactions to and verbalize acceptance of impending loss. Outcome: Progressing Towards Goal 
  
Problem: Anxiety/Agitation Goal: Verbalize and demonstrate ability to manage anxiety Description: The patient/family/caregiver will verbalize and demonstrate ability to manage the patient's anxiety throughout hospice care. Outcome: Progressing Towards Goal 
  
Problem: Anxiety/Agitation Goal: Verbalize and demonstrate ability to manage anxiety Description: The patient/family/caregiver will verbalize and demonstrate ability to manage the patient's anxiety throughout hospice care. Outcome: Progressing Towards Goal 
  
Problem: End of Life Process Goal: Demonstrate understanding of end of life processes Description: Patient/caregiver will understand end of life processes. Outcome: Progressing Towards Goal 
  
Problem: End of Life Process Goal: Demonstrate understanding of end of life processes Description: Patient/caregiver will understand end of life processes.  
Outcome: Progressing Towards Goal

## 2021-03-12 NOTE — PROGRESS NOTES
followed up with Mr. Hari Finley for end-of-life support on the [facility]. Mr. Hari Finley was lying in bed and appeared to be resting comfortably. No family was present at this time.  provided silent prayer and presence by the beside. Per Fr. Vega's notes, Mr. Hari Finley received the anointing of the sick yesterday evening per family's request. 's are available for further support upon referral 
Ivan Grady. Zaira Newman.  Paging Service: [phone] ([phone])

## 2021-03-12 NOTE — PROGRESS NOTES
This was an initial visit to assess needs and offer support. Upon arrival, Mr Rachel Lemons was in bed and appeared to be sleeping. There was no family present. He received a visit earlier in the day from a hospital , who offered prayers at bedside and last night from Fr. Hightower who offered anointing of the sick. As no family was present, I reached out to his wife via phone. She was crying when she answered the phone. Assured her I had no new information for her but was reaching out to see how we could best support her. Offered supportive active listening as she shared her grief and pertinent events. She noted that she knows that it is best for him to go and for that reason she wants him to go. However, it hurts and she had hoped for a different outcome. Affirmed her grief, her  love and her concern. She was not crying at the end of our conversation but noted she did not want to talk further or she might start crying. She thanked me for the call and all the support they are receiving from hospice. Assured her of our prayers and continuing availability. She plans to visit this afternoon after her daughter get home from work. Pastoral care will continue to visit/call for grief support to wife and presence for the patient.

## 2021-03-12 NOTE — HOSPICE
[facility] Good Help to Those in Need 
[phone] Social Work Admission Note Patient Name: Jefry Colbert YOB: 1930 Age: 80 y.o. Date of Visit: 21 Facility of Care: [facility] Patient Room: 519/01 Hospice Attending: Zofia Law MD 
Hospice Diagnosis: Acute respiratory failure ([address] [J96.00] Level of Care:  
 [x]  GIP []  Respite 
 []  Routine NARRATIVE  
LCSW met with patient at bedside. No family present at this time. Patient unresponsive to sound and touch. He has nasal 02 in place but is breathing primarily through his mouth with some apnea noted. LCSW placed call to patient's wife to offer support. She did not answer, LCSW left VM sharing availability of both SW and  for support and will await call back. ADVANCE CARE PLANNING Code Status: DNR Durable DNR: _ Yes  _ No 
No flowsheet data found. Relationship Status: 
[]  Single    
[]       
[]     
[]  Domestic Partner    
[]  / 
[]  Common Law 
[]   
[]  Unknown If in a relationship, name of partner/spouse: 
Duration of relationship: 
 
Oriental orthodox: Hinduism  Home: TBD Resources Provided: Offer of virtual support Social Work Initial Assessment Gender: 
male Race/Ethnicity: (dottie all that apply) []  [place]) or [place] Native 
[]  [place] 
[]  [place] or [place] 
[]   or  
[]  Native Epifanio or [facility] 
[]  John Delgado [x]  Unknown 
  
 Service:   
[]  Yes  
[]  No      
[]  Unknown Appropriate for Pinning Ceremony:  
[]  Yes     
[]  No 
Is patient using VA benefits? []  Yes     
[]  No 
  
Primary Language: English 
[]   Needed 
[]   utilized during visit Ability to express thoughts/needs/feelings 
[]  Expressed thoughts/feelings/needs without difficulty 
[]  Requires extra time and cuing 
[]  Speech limited single words 
[]  Uses only gestures (eye, blinking eye or head movement/pointing) []  Unable to express thoughts/feelings/needs (speech unintelligible or inappropriate) [x]  Unresponsive Notes:  
  
Mental Status: 
[]  Alert-oriented to:   
 []  Person   
 []  Place   
 []  Time 
[]  Comatose-responds to:  
 []   Verbal stimuli  
 []  Tactile stimuli  
 []  Painful stimuli 
[]  Forgetful 
[]  Disoriented/Confused 
[x]  Lethargic 
[]  Agitated 
[]  Other (specify):   
Notes:  
  
Patients description of Illness/Current Health Status:   
[x]  Patient unable to discuss 
[]  Patient unwilling to discuss 
[]  (Specify) Knowledge/Understanding of Disease Process Patient:  
 []  Demonstrates knowledge/understanding of disease process 
 []  Demonstrates knowledge/understanding of treatment plan 
 []  Demonstrates knowledge/understanding of prognosis []  Demonstrates acceptance of prognosis []  Demonstrates knowledge/understanding of resuscitation status 
 []  Other (specify) Caregiver: 
 []  Demonstrates knowledge/understanding of disease process 
 []  Demonstrates knowledge/understanding of treatment plan 
 []  Demonstrates knowledge/understanding of prognosis []  Demonstrates acceptance of prognosis []  Demonstrates knowledge/understanding of resuscitation status 
 []  Other (specify) Notes:  
  
Patients living arrangement/care setting: 
Use the PRIOR COLUMN when the PATIENTS current health status necessitated a change in his/her primary residence. Prior Current Response  
           []             []    Patients own home/residence []             []    Home of family member/friend []             []    Boarding home  
           []             []    Assisted living facility/care home center []             []    Hospital/Acute care facility []             []    Skilled nursing facility            []             []    Long term care facility/Nursing home  
           []             [] Hospice in Patient Primary Caregiver: 
[]  No Primary Caregiver Name of Primary Caregiver: Bernard Jimenez Relationship or Primary Caregiver:  
 [x]  Spouse/Significant other     
 []  Natural Child      
 []  Step child     
 []  Sibling 
 []  Parent 
 []  Friend/Neighbor 
 []  Community/Caodaism Volunteer 
 []  Paid help 
 []  Other (specify):___________ Notes:   
  
Family members/Significant others: 
Name: 
Relationship: 
Phone Number: Actively involved in care? []  Yes  []  No 
 
Name: 
Relationship: 
Phone Number: Actively involved in care? []  Yes  []  No 
 
Name: 
Relationship: 
Phone Number: Actively involved in care? []  Yes  []  No 
 
Social support systems: (select ONE best description) []  Excellent social support system which includes three or more family members or friends 
[x]  Good social support system which includes two or less members or friends 
[]  [address] support which includes one family member or friend 
[]  Poor social support; no family members or friends; basically ALONE Notes:  
  
Emotional Status: (dottie all that apply) Patient Caregiver Response  
              []                []    Mood/Affect stable and appropriate    
              []                []    Angry  
              []                []    Anxious []                []    Apprehensive []                []    Avoidant  
              []                []    Clinging  
              []                []    Depressed  
              []                []    Distraught  
              []                []    Elated []                []    Euphoric  
              []                []    Fearful  
              []                []    Flat Affect  
              []                []    Helpless []                []    Hostile []                []    Impulsive               []                []    Irritable  
              [] []    Labile  
              []                []    Manic  
              []                []    Restlessness []                []    Sad  
              []                []    Suspicious []                []    Tearful  
              []                []    Withdrawn Notes:  
 
Coping Skills (strengths/weakness):  
 Patient: Coping Skills (strength/weakness):  
 Family/caregiver (strength/weakness): 
  
[place] of care (dottie all that apply):    
[x]  No burden evident  
[]  Family must administer medications  
[]  Illness causing financial strain  
[]  Family/Support feels overwhelmed  
[]  Family/Support sleep disturbed with patients care  
[]  Patients care causes extra physical stress  of death 
[]  Illness causes changes in family lifestyle 
[]  Illness impacting family/support employment 
[]  Family experiencing increased time demands 
[]  Patients behavior endangers family 
[]  Denial of patients illness 
[]  Concern over outcome of illness/fear 
[]  Patients behavior embarrassing to family Notes:  
  
Risk Factors: (dottie all that apply):   
[x]  No burden evident  
[]  Alcohol abuse 
[]  Financial resources inadequate to meet basic needs (food/house/etc) []  Financial resources inadequate to meet health care needs (supplies/equipment/medications) 
[]  Food/nutrition resources inadequate 
[]  Home environment unsafe/inadequate for home care 
[]  Homicidal risk 
[]  Lives alone or without concerned relatives 
[]  Multiple medications/complex schedule 
[]  Physical limitations increase likelihood of falls 
[]  Plan of care/treatments complicated 
[]  Substance use/abuse 
[]  Suicidal risk 
[]  Visual impairment threatens safety/ability to perform self-care 
[]  Other (specify): 
  
Abuse/Neglect (actual/potential risks): 
[x]  No signs of abuse/neglect 
[]  History of abuse/neglect                 []  [id number]          []  Sexual 
[]  History of domestic violence 
[]  Lacks adequate physical care 
[]  Lacks emotional nurturing/support 
[]  Lacks appropriate stimulation/cognitive experiences 
[]  Left alone inappropriately 
[]  Lacks necessary supervision 
[]  Inadequate or delayed medical care 
[]  Unsafe environment (i.e guns/drug use/history of violence in the home/etc.) []  Bruising or other physical signs of injury present 
[]  Other (specify): 
Notes:  
[]  Refer to child/adult protective services Current Sources of Stress (in Addition to Current Illness):  
[x]  None reported 
[]  Bills/Debt   
[]  Career/Job change   
[]   (short term)   
[]   (long term)   
[]  Death of a child (recent)   
[]  Death of a parent (recent)  
[]  Death of a spouse (recent)  
[]  Employment status changed  
[]  Family discord   
[]  Financial loss/Inadequate inther (specify):come 
[]  Job loss 
[]  Legal issues unresolved 
[]  Lifestyle change 
[]  Marital discord 
[]  Marriage within the last year 
[]  Paperwork (insurance/legal/etc) overwhelming 
[]  Separation/Divorce 
[]  Other (specify): 
Notes:  
  
Current Community Resources Being Utilized 1. [facility] for GIP care Interventions/Plan of Care 1. Assess social and emotional factors related to coping with end of life issues 2. Community resource planning/referral  
3. Relocation to different care setting if/when symptoms stabilize Discharge Planning 1. Should patient stabilize, wife will take him home MSW Assessment Completed by: Minna Mraie 03/12/21 Time In: 1250 Time Out: 120

## 2021-03-12 NOTE — PROGRESS NOTES
Bedside and Verbal shift change report given to [address] Rosaline Santos (oncoming nurse) by Starla Granado (offgoing nurse). Report included the following information SBAR, Kardex and MAR.

## 2021-03-12 NOTE — HOSPICE
[facility] Good Help to Those in Need 
[phone] GIP Daily Nursing Note Patient Name: Royce Morales YOB: 1930 Age: 80 y.o. Date of Visit: 03/12/21 Facility of Care: [facility] Patient Room: [facility]/ Hospice Attending: Magdaleno Simons MD 
Hospice Diagnosis: Acute respiratory failure ([address] [J96.00] Level of Care: GIP Current GIP Symptoms 1. Dyspnea 2. Generalized pain 3.agitation ASSESSMENT & PLAN 1. GIP for management of dyspnea, generalized pain and agitation 2. Morphine 2mg IV every 4 hours 3. Ativan 1mg IV every 4 hours Spiritual Interventions: Bárbara Tracy visited last night and gave anointing and commendation Psych/ Social/ Emotional Interventions: no family at bedside this am. Will call wife with update and visit patient throughout the day Care Coordination Needs: communicate with floor nurses and hospice physicians to insure patient comfort and make medication adjustments as needed Care plan and New Orders discussed / approved with Dr Vy Stubbs MD. Description History and Chart Review Narrative History of last 24 hours that demonstrates care cannot be provided in another setting: 
Requiring scheduled morphine and ativan IV every 4 hours What has been done to control the patient's symptoms in the last 24 hours? Morphine 2mg IV every 4 hours Ativan 1mg IV every 4 hours Does the patient currently require IV medications? Yes Does the patient currently require scheduled medications? yes Does the patient currently require a PCA? no 
 
List number of doses of PRN medications in last 24 hours: 
Medication 1: 
Number of doses: 
 
Medication 2:  
Number of doses: 
 
Medication 3:  
Number of doses: Supporting documentation for GIP need for pain control: 
[x] Frequent evaluation by a doctor, nurse practitioner, nurse  
[] Frequent medication adjustment   
[x] IVs that cannot be administered at home  
[x] Aggressive pain management  
[] Complicated technical delivery of medications Supporting documentation for GIP need for symptom control: 
[x]  Sudden decline necessitating intensive nursing intervention 
[]  Uncontrolled / intractable nausea or vomiting  
[]  Pathological fractures 
[]  Advanced open wounds requiring frequent skilled care 
[] Unmanageable respiratory distress 
[] New or worsening delirium  
[] Delirium with behavior issues: Is 24 hour caregiver present due to safety concerns with agitation? (yes/no) [] Imminent death  with skilled nursing needs documented above DISCHARGE PLANNING 1. Discharge Plan: if patient stabilizes wife would like to take him home with support of hospice 2. Patient/Family teaching: end of life symptoms and care. Report called to wife, Karina Capps, she and her daughter plan to visit this afternoon 3. Response to patient/family teaching: wife and dtr verbalized understanding ASSESSMENT   
KARNOFSKY: 10 Prognosis estimated based on 03/12/21 clinical assessment is:  
[] Few to Many Hours [x] Hours to Days  
[] Few to Many Days  
[] Days to Weeks  
[] Few to Many Weeks  
[] Weeks to Months  
[] Few to Many Months Quality Measure: Patient self-reports:  [] Yes    [x] No 
 
ESAS:  
Time of Assessment: 9:00am 
Pain (1-10):4 Fatigue (1-10): 10 Shortness of breath (1-10):6 Nausea (1-10): 0 Appetite (1-10): 0 Anxiety: (1-10):0 Depression: (1-10): Well-being: (1-10): Constipation: _ Yes  _ No 
LAST BM:  
 
CLINICAL INFORMATION Patient Vitals for the past 12 hrs: 
 Temp Pulse Resp BP SpO2  
03/12/21 0816 97.2 °F (36.2 °C) 95 16 (!) 74/41   
03/12/21 0336 98 °F (36.7 °C) 86 18 (!) 70/43   
03/11/21 2339 97.8 °F (36.6 °C) 86 17 (!) 71/44 98 % Currently this patient has: 
[x] Supplemental O2 [x] IV   
[] PICC [] PORT  
[] NG Tube   
[] PEG Tube  
[] Ostomy    
[x] Singh draining dark yellow urine [x] Other:cholycystostomy drain: draining roberta liquid SIGNS/PHYSICAL FINDINGS Skin (including wound): 
[] Warm, dry, supple, intact and color normal for race [x] Warm  
[x] Dry  
[] Cool    
[] Clammy      
[] Diaphoretic Turgor 
 [] Normal 
 [x] Decreased Color: 
 [] Pink [x] Pale 
 [] Cyanotic 
 [] Erythema 
 [] Jaundice [] Normal for Race 
[]  Wounds: 
 
Neuro: 
[] Lethargy 
[] Restlessness / agitation 
[] Confusion / delirium 
[] Hallucinations 
[] Responds to maximal stimulation 
[x] Unresponsive 
[] Seizures Cardiac: 
[] Dyspnea on Exertion 
[] JVD [] Murmur 
[] Palpitations [x] Hypotension 
[] Hypertension 
[] Tachycardia [] Bradycardia 
[] Irregular HR 
[] Pulses Decreased 
[] Pulses Absent [x] Edema: feet 2+ 
[] Mottling:       
 
Respiratory: 
Breath sounds:  
 [x] Diminished 
 [] Wheeze 
 [] Rhonchi 
 [] Rales  
[] Even and unlabored 
[] Labored:  
[] Cough 
 [] Non Productive 
 [] Productive 
  [] Description:          
[] Deep suctioned  
[x] O2 at 2 LPM 
[] High flow oxygen greater than 10 LPM 
[] Bi-Pap GI 
[] Abdomen (describe) [] Ascites 
[] Nausea 
[] Vomiting 
[] Incontinent of bowels 
[] Bowel sounds (yes/no) [] Diarrhea 
[] Constipation (see above including last bowel movement) [] Checked for impaction 
[] Last BM Nutrition Diet: NPO Appetite:  
[] Good  
[] Fair  
[] Poor  
[] Tube Feeding  
[] Voiding 
[] Incontinent [x] Singh Musculoskeletal 
[] Balance/Addy Unsteady  
[] Weak Strength:  
 [] Normal  
 [] Limited [x] Decreasing Activities:  
 [] Up as tolerated 
 [x] Bedridden  
 [] Specify: 
 
SAFETY [] 24 hr. Caregiver [x] Side rails ? [x] Hospital bed  
[] Reviewed Falls & Safety ALLERGIES AND MEDICATIONS Allergies: No Known Allergies Current Facility-Administered Medications Medication Dose Route Frequency  glycopyrrolate (ROBINUL) injection 0.2 mg  0.2 mg IntraVENous Q4H PRN  
 morphine injection 2 mg  2 mg IntraVENous Q15MIN PRN  
 LORazepam (ATIVAN) injection 1 mg  1 mg IntraVENous Q15MIN PRN  
 morphine injection 2 mg  2 mg IntraVENous Q4H  
 LORazepam (ATIVAN) injection 1 mg  1 mg IntraVENous Q4H  
 bisacodyL (DULCOLAX) suppository 10 mg  10 mg Rectal DAILY PRN Visit Time In: 9:00 Visit Time Out: 9:15

## 2021-03-12 NOTE — ROUTINE PROCESS
Verbal shift change report given to Brennen (oncoming nurse) by Deedee Howell RN (offgoing nurse). Report included the following information SBAR, Kardex, MAR and Accordion.

## 2021-03-12 NOTE — H&P
[facility] Good Help to Those in Need 
[phone] Patient Name: Mariam Mercado YOB: 1930 Date of Provider Hospice Visit: 03/12/21 Level of Care:   [x] General Inpatient (GIP)    [] Routine   [] Respite Current Location of Care: 
[] [facility] [x] [facility] [] [facility] [] [facility] [] Hospice [facility] IF [facility], patient referred from: 
[] [facility] [] [facility] [] [facility] [] [facility] [] Home [] Other:  
 
Date of Original Hospice Admission: 03/11/2021 Hospice Medical Director at time of admission: Angel [facility]ast[facility] Diagnosis: Acute respiratory failure Diagnoses RELATED to the terminal prognosis: recent admission for sepsis due to gallbladder pathology and urinary retention. Other Diagnoses: h/o polio, hypertension, hyperlipidemia Hadley El Do not cut and paste chart information other than imaging findings Mariam Mercado is a 80y.o. year old who was admitted to [facility] with Acute respiratory failure. Pt with medical history of chronic debility being wheelchair bound due to a h/o polio, hypertension, hyperlipidemia and recent admission for sepsis due to gallbladder pathology and urinary retention was receiving facility based rehabilitation that he failed, found minimally responsive at home and brought in. ER evaluation showed hypotension, hypoxic respiratory failure with need for 100% NRB, worsening somnolence. His prognosis was considered poor and referred for hospice care. Refer to [facility] Functionally, the patient's Karnofsky and/or Palliative Performance Scale has declined over a period of weeks and is estimated at 10%. The patient is dependent on the following ADLs:all The patient/family chose comfort measures with the support of Hospice. HOSPICE DIAGNOSES Active Symptoms: 
1. Labored breathing 2. Unresponsiveness 3. Delirium; fluctuating 4. Decline in function 5. Cholecystitis- cholecystostomy placed 2/19 6.  Urinary retention- indwelling ernst in place 7. Hospice care pt PLAN 1. Admit pt to [facility] hospice as pt is declining steadily and symptomatic, unstable for transport and requiring close monitoring and frequent medication administration parenterally. 2. Morphine 2mg IV scheduled every 4 hours and every 15mts as needed 3. Ativan 1mg IV scheduled every 4 hours and every 15mts as needed 4. Other comfort meds as needed 5.  and SW to support family needs 6. Disposition: home with hospice if stabilizes 7. Hospice Plan of care was reviewed in detail and agree with current plan of care Prognosis estimated based on 03/12/21 clinical assessment is:  
[x] Hours to Days   
[] Days to Weeks   
[] Other: 
 
Communicated plan of care with: Hospice Case Manager; Hospice IDT; Care Team 
 
 GOALS OF CARE Patient/Medical POA stated Goal of Care: comfort [x] I have reviewed and/or updated ACP information in the Advance Care Planning Navigator. This information is available in the [address] link in the patient's chart header. Primary Decision [facility] Agent):   Primary Decision Maker (Active): Brigette Bambi Spouse - [phone] Resuscitation Status: DNR If DNR is there a Durable DNR on file? : [x] Yes [] No (If no, complete Durable DNR) HISTORY History obtained from: chart review, hospice liaison CHIEF COMPLAINT: N/A The patient is:  
[] Verbal 
[x] Nonverbal 
[x] Unresponsive HPI/SUBJECTIVE:  Pt laying unresponsive, no signs of pain, distress, breathing is slow and with pauses Pt has been getting all scheduled meds REVIEW OF SYSTEMS The following systems were: [] reviewed  [x] unable to be reviewed Positive ROS include: 
Constitutional: fatigue, weakness, in pain, short of breath Ears/nose/mouth/throat: increased airway secretions Respiratory:shortness of breath, wheezing Gastrointestinal:poor appetite, nausea, vomiting, abdominal pain, constipation, diarrhea Musculoskeletal:pain, deformities, swelling legs Neurologic:confusion, hallucinations, weakness Psychiatric:anxiety, feeling depressed, poor sleep Endocrine:  
 
Adult Non-Verbal Pain Assessment Score: 4 Face [x] 0   No particular expression or smile 
[] 1   Occasional grimace, tearing, frowning, wrinkled forehead 
[] 2   Frequent grimace, tearing, frowning, wrinkled forehead Activity (movement) [x] 0   Lying quietly, normal position 
[] 1   Seeking attention through movement or slow, cautious movement 
[] 2   Restless, excessive activity and/or withdrawal reflexes Guarding 
[x] 0   Lying quietly, no positioning of hands over areas of body 
[] 1   Splinting areas of the body, tense 
[] 2   Rigid, stiff Physiology (vital signs) 
[] 0   Stable vital signs [] 1   Change in any of the following: SBP > 20mm Hg; HR > 20/minute [x] 2   Change in any of the following: SBP > 30mm Hg; HR > 25/minute Respiratory 
[] 0   Baseline RR/SpO2, compliant with ventilator 
[] 1   RR > 10 above baseline, or 5% drop SpO2, mild asynchrony with ventilator 
[x] 2   RR > 20 above baseline, or 10% drop SpO2, asynchrony with ventilator FUNCTIONAL ASSESSMENT Palliative Performance Scale (PPS):10% PSYCHOSOCIAL/SPIRITUAL ASSESSMENT Active Problems: 
  Acute respiratory failure ([address] (3/11/2021) Past Medical History:  
Diagnosis Date  Contact dermatitis and other eczema, due to unspecified cause  Hypercholesterolemia  Hypertension Past Surgical History:  
Procedure Laterality Date  HX APPENDECTOMY  HX PROSTATECTOMY  1986  
 for BPH  
 HX UROLOGICAL    
 IR CHOLECYSTOSTOMY PERCUTANEOUS SI  2/19/2021 Social History Tobacco Use  Smoking status: Never Smoker  Smokeless tobacco: Never Used Substance Use Topics  Alcohol use: Yes Comment: 2 OZ A DAY Family History Problem Relation Age of Onset  Cancer Mother No Known Allergies Current Facility-Administered Medications Medication Dose Route Frequency  glycopyrrolate (ROBINUL) injection 0.2 mg  0.2 mg IntraVENous Q4H PRN  
 morphine injection 2 mg  2 mg IntraVENous Q15MIN PRN  
 LORazepam (ATIVAN) injection 1 mg  1 mg IntraVENous Q15MIN PRN  
 morphine injection 2 mg  2 mg IntraVENous Q4H  
 LORazepam (ATIVAN) injection 1 mg  1 mg IntraVENous Q4H  
 bisacodyL (DULCOLAX) suppository 10 mg  10 mg Rectal DAILY PRN  
 
 
 PHYSICAL EXAM  
 
Wt Readings from Last 3 Encounters:  
03/11/21 82.2 kg (181 lb 4.8 oz) 02/23/21 83.5 kg (184 lb)  
11/14/13 83.9 kg (185 lb) Visit Vitals BP (!) 74/41 (BP 1 Location: Right upper arm, BP Patient Position: At rest;Lying) Pulse 95 Temp 97.2 °F (36.2 °C) Resp 16 Wt 82.2 kg (181 lb 4.8 oz) SpO2 98% BMI 31.12 kg/m² Supplemental O2  [] Yes  [x] NO Last bowel movement: 
 
Currently this patient has: 
[] Peripheral IV [] PICC  [] PORT [] ICD [x] Ernst Catheter [] NG Tube   [] PEG Tube   
[] Rectal Tube [x] Drain 
[] Other:  
 
Constitutional:lethargic, unresponsive, appears to be having apnea Eyes:closed ENMT: dry mucous membranes Cardiovascular: slightly tachycardic Respiratory: apneic breathing, slow breathing Gastrointestinal: distended soft abdomen, non tender, biliary drain present Musculoskeletal: no tenderness or deformities Skin:areas of bruising Neurologic:lethargic, unresponsive Psychiatric: N/A Other: ernst's catheter draining slightly blood tinged urine Pertinent Lab and or Imaging Tests: 
Lab Results Component Value Date/Time  Sodium 144 02/25/2021 01:19 AM  
 Potassium 3.2 (L) 02/25/2021 01:19 AM  
 Chloride 113 (H) 02/25/2021 01:19 AM  
 CO2 23 02/25/2021 01:19 AM  
 Anion gap 8 02/25/2021 01:19 AM  
 Glucose 98 02/25/2021 01:19 AM  
 Glucose 102 01/19/2010 10:14 AM  
 BUN 25 (H) 02/25/2021 01:19 AM  
 Creatinine 1.21 02/25/2021 01:19 AM  
 BUN/Creatinine ratio 21 (H) 02/25/2021 01:19 AM  
 GFR est AA >60 02/25/2021 01:19 AM  
 GFR est non-AA 56 (L) 02/25/2021 01:19 AM  
 Calcium 7.8 (L) 02/25/2021 01:19 AM  
 
Lab Results Component Value Date/Time Protein, total 5.0 (L) 02/25/2021 01:19 AM  
 Albumin 1.9 (L) 02/25/2021 01:19 AM  
 
   
 
Total time: 70mts Counseling / coordination time: 30mts > 50% counseling / coordination?:

## 2021-03-13 NOTE — HOSPICE
[facility] Good Help to Those in Need 
[phone] GIP Daily Nursing Note Patient Name: Shreya Esquivel YOB: 1930 Age: 80 y.o. Date of Visit: 03/13/21 Facility of Care: [facility] Patient Room: [facility]/ Hospice Attending: Marilyn Carty MD 
Hospice Diagnosis: Acute respiratory failure ([address] [J96.00] Level of Care: GIP Current GIP Symptoms 1. Dyspnea 2. Generalized pain 3.agitation 4. Edema ASSESSMENT & PLAN 1. GIP for management of dyspnea, generalized pain and agitation 2. Morphine 2mg IV every 4 hours 3. Ativan 1mg IV every 4 hours Spiritual Interventions: Loan Baca visited yesterday and gave anointing and commendation Psych/ Social/ Emotional Interventions: no family at bedside this am. Will call wife with update and visit patient throughout the day Care Coordination Needs: communicate with floor nurses and hospice physicians to insure patient comfort and make medication adjustments as needed Care plan and New Orders discussed / approved with Dr Alfredo Garcia MD. Description History and Chart Review Narrative History of last 24 hours that demonstrates care cannot be provided in another setting: 
Requiring scheduled morphine and ativan IV every 4 hours What has been done to control the patient's symptoms in the last 24 hours? Morphine 2mg IV every 4 hours Ativan 1mg IV every 4 hours Does the patient currently require IV medications? Yes Does the patient currently require scheduled medications? yes Does the patient currently require a PCA? no 
 
List number of doses of PRN medications in last 24 hours: 
Medication 1: 
Number of doses: 
 
Medication 2:  
Number of doses: 
 
Medication 3:  
Number of doses: Supporting documentation for GIP need for pain control: 
[x] Frequent evaluation by a doctor, nurse practitioner, nurse  
[] Frequent medication adjustment   
[x] IVs that cannot be administered at home  
[x] Aggressive pain management  
[] Complicated technical delivery of medications Supporting documentation for GIP need for symptom control: 
[x]  Sudden decline necessitating intensive nursing intervention 
[]  Uncontrolled / intractable nausea or vomiting  
[]  Pathological fractures 
[]  Advanced open wounds requiring frequent skilled care 
[] Unmanageable respiratory distress 
[] New or worsening delirium  
[] Delirium with behavior issues: Is 24 hour caregiver present due to safety concerns with agitation? (yes/no) [] Imminent death  with skilled nursing needs documented above DISCHARGE PLANNING 1. Discharge Plan: if patient stabilizes wife would like to take him home with support of hospice 2. Patient/Family teaching: end of life symptoms and care. Report called to wife, Edu Aguirre, Family is planning on visiting after lunch. Children are coming in from [place]. 3. Response to patient/family teaching: wife and dtr verbalized understanding ASSESSMENT   
KARNOFSKY: 10 Prognosis estimated based on 03/13/21 clinical assessment is:  
[] Few to Many Hours [x] Hours to Days  
[] Few to Many Days  
[] Days to Weeks  
[] Few to Many Weeks  
[] Weeks to Months  
[] Few to Many Months Quality Measure: Patient self-reports:  [] Yes    [x] No 
 
ESAS:  
Time of Assessment: 9:00am 
Pain (1-10):4 Fatigue (1-10): 2 Shortness of breath (1-10):4 Nausea (1-10): 0 Appetite (1-10): 0 Anxiety: (1-10):0 Depression: (1-10): Well-being: (1-10): Constipation: _ Yes  _ No 
LAST BM:  
 
CLINICAL INFORMATION Patient Vitals for the past 12 hrs: 
 Temp Pulse Resp SpO2  
03/13/21 0000 97.4 °F (36.3 °C) (!) 101 16 92 % Currently this patient has: 
[x] Supplemental O2 [x] IV   
[] PICC [] PORT  
[] NG Tube   
[] PEG Tube  
[] Ostomy    
[x] Singh draining dark yellow urine [x] Other:cholycystostomy drain: draining roberta liquid SIGNS/PHYSICAL FINDINGS Skin (including wound): 
[] Warm, dry, supple, intact and color normal for race [x] Warm  
[x] Dry  
[] Cool    
[] Clammy      
[] Diaphoretic Turgor 
 [] Normal 
 [x] Decreased Color: 
 [] Pink [x] Pale 
 [] Cyanotic 
 [] Erythema 
 [] Jaundice [] Normal for Race 
[]  Wounds: 
 
Neuro: 
[] Lethargy 
[] Restlessness / agitation 
[] Confusion / delirium 
[] Hallucinations 
[] Responds to maximal stimulation 
[x] Unresponsive 
[] Seizures Cardiac: 
[] Dyspnea on Exertion 
[] JVD [] Murmur 
[] Palpitations [x] Hypotension 
[] Hypertension 
[] Tachycardia [] Bradycardia 
[] Irregular HR 
[] Pulses Decreased 
[] Pulses Absent [x] Edema: feet 2+ 
[] Mottling:       
 
Respiratory: 
Breath sounds:  
 [x] Diminished 
 [] Wheeze 
 [] Rhonchi 
 [] Rales  
[] Even and unlabored 
[x] Labored:  
[] Cough 
 [] Non Productive 
 [] Productive 
  [] Description:          
[] Deep suctioned  
[x] O2 at 2 LPM 
[] High flow oxygen greater than 10 LPM 
[] Bi-Pap GI 
[] Abdomen (describe) [] Ascites 
[] Nausea 
[] Vomiting 
[x] Incontinent of bowels 
[] Bowel sounds (yes/no) [] Diarrhea 
[] Constipation (see above including last bowel movement) [] Checked for impaction 
[] Last BM Nutrition Diet: NPO Appetite:  
[] Good  
[] Fair  
[] Poor  
[] Tube Feeding  
[] Voiding 
[] Incontinent [x] Singh Musculoskeletal 
[] Balance/[place] Unsteady  
[] Weak Strength:  
 [] Normal  
 [] Limited [x] Decreasing Activities:  
 [] Up as tolerated 
 [x] Bedridden  
 [] Specify: 
 
SAFETY [] 24 hr. Caregiver [x] Side rails ? [x] Hospital bed  
[] Reviewed Falls & Safety ALLERGIES AND MEDICATIONS Allergies: No Known Allergies Current Facility-Administered Medications Medication Dose Route Frequency  glycopyrrolate (ROBINUL) injection 0.2 mg  0.2 mg IntraVENous Q4H PRN  
 morphine injection 2 mg  2 mg IntraVENous Q15MIN PRN  
 LORazepam (ATIVAN) injection 1 mg  1 mg IntraVENous Q15MIN PRN  
 morphine injection 2 mg  2 mg IntraVENous Q4H  
 LORazepam (ATIVAN) injection 1 mg  1 mg IntraVENous Q4H  
 bisacodyL (DULCOLAX) suppository 10 mg  10 mg Rectal DAILY PRN Visit Time In: 961 Visit Time Out: 9:15

## 2021-03-13 NOTE — PROGRESS NOTES
Bedside and Verbal shift change report given to MALKA Mclean (oncoming nurse) by Brian Alexandre RN (offgoing nurse). Report included the following information SBAR, Kardex, Intake/Output, MAR and Accordion.

## 2021-03-13 NOTE — PROGRESS NOTES
visited Mr. Rodriguez on the [facility] Surgical Unit for an End-of-Life visit. Mr. Mayito Pulliam wife, Chico Ramirez and daughter were present at the bedside.  provided words of comfort and supportive presence. Chico Ramirez openly shared about the events that lead to Mr. Rodriguez's hospitalization. It was a very frightening event for her. She also engaged in story telling and life review, sharing that they have had a wonderful life together. As  and Chico Ramirez were speaking, Mr. Mayito Pulliam son came into the room.  introduced herself and extended support. Family inquired about whether Fr. Vega was able to visit and  confirmed that he did.  politely disengaged in the visit to allow family to spend time together. They are aware of the 's availability. 's are available for further support upon referral 
Ivan Grady. Lennie Feng.  Paging Service: [phone])

## 2021-03-13 NOTE — PROGRESS NOTES
Bedside shift change report given to Rafael Horan (oncoming nurse) by Graciela Bowens (offgoing nurse). Report included the following information SBAR, Kardex, Intake/Output and MAR.

## 2021-03-14 NOTE — HOSPICE
[facility] Good [facility] Those [facility] Need 
[phone] Discharge/Death Nursing Note Patient Name: Kelsea Jacobo YOB: 1930 Age: 80 y.o. Date of Death: 21 Admitted Date: 3/11/2021 Time of Death: 80 Facility of Care: [facility] Level of Care: GIP Patient Room: [facility] Hospice Attending: No att. providers found Hospice Diagnosis: Acute respiratory failure ([address] [J96.00] Death Pronouncement Pronouncement of death completed by: DR Tara Sandifer Agency staff Jenniffer Prescott NOT present at the time of death At the time of death the patient was documented as he was lying in bed; there was no response to verbal or painful stimuli; pupils were fixed and dilated; there was no pulse, no respirations and no heart sounds. The pt  within [facility] The following were notified of the patient's death: Wife,  Shaneka Olivia Medications were disposed of per facility protocol Discharge Summary Discharge Reason: Death Summary of Care Provided:Calling patients wife to offer support and condolencReggie carey  home will collect patient. [x] Post mortem care provided by facility staff [x] Notification of  home by nursing superviser 
[] Referrals/Community resources provided:  
[] Goals completed 
[] Durable Medical Equipment vendor notified Disciplines involved: [x] RN [x] SW [x]  [] HERRERA [] Vol [] PT [] OT [] ST [] BC 
 
[x] IDT communication/notification Attending Physician, Dr. Kate Thibodeaux, notified of death Bereaved Shaneka Olivia , wife [phone], 4 children No flowsheet data found.

## 2021-03-14 NOTE — ROUTINE PROCESS
The beginning of Daylight Saving Time occurred at 0200 hrs. Documentation of patient care; medications administered; and time of death is done with respect to the time change.

## 2021-03-14 NOTE — PROGRESS NOTES
I was called to see Mr. Rodriguez due to patient's death. On my arrival, he was lying in bed; there was no response to verbal or painful stimuli; pupils were fixed and dilated; there was no pulse, no respirations and no heart sounds. Guilherme Rodriguez was pronounced dead at 3:25 AM. Death certificate and DC summary to be completed by attending of record. Erik Sauer MD 
3/14/2021

## 2021-03-14 NOTE — PROGRESS NOTES
responded to the notification of death for Mr. Rodriguez on the Med. Surgical Unit.  consulted with her nurse who shared that they are still trying to reach out to family.  provided supportive presence and silent prayer at the bedside. 's are available for further support upon referral 
[employer]. Lennie Feng.  Paging Service: [phone])

## 2021-03-14 NOTE — PROGRESS NOTES
0134: Patient without respirations and pulses; contacted Dr. Chantelle Blanchard to pronounce. Left VM message for patient's wife to call me back. 0150: Notified Gina Warren; she will be at bedside in approximately 20-30 mins. Contacted [employer] automated service. 0200: Attempted to contact patient's wife again at [phone]; still no answer. Informed Nursing Supervisor of death and attempts to contact wife. 
 
0500: LM another VM message for patient's wife to call the hospital. 
 
0530: Wife, Aguila Perales, returned my call. Patient will be buried in [place]; will call back with  home information.

## 2021-03-15 NOTE — DISCHARGE SUMMARY
Discharge Summary [facility] Good Help to Those in Need 
[phone] Date of Admission: 3/11/2021 Date of Discharge: 3/14/2021 Spencer Smallwood is a 80y.o. year old who was admitted to [facility] at [facility] with a Hospice diagnosis of Acute respiratory failure ([facility] [address] [J96.00]. Pt was admitted for [facility] level care. Per HPI Active Symptoms: 
1. Labored breathing 2. Unresponsiveness 3. Delirium; fluctuating 4. Decline in function 5. Cholecystitis- cholecystostomy placed 2/19 6. Urinary retention- indwelling ernst in place 7. Hospice care pt 
  
 PLAN 1. Admit pt to [facility] hospice as pt is declining steadily and symptomatic, unstable for transport and requiring close monitoring and frequent medication administration parenterally. 2. Morphine 2mg IV scheduled every 4 hours and every 15mts as needed 3. Ativan 1mg IV scheduled every 4 hours and every 15mts as needed 4. Other comfort meds as needed 
  
5.  and SW to support family needs 6. Disposition: home with hospice if stabilizes 7. Hospice Plan of care was reviewed in detail and agree with current plan of care The patient's care was focused on comfort and the patient passed away on 3/14/2021.
